# Patient Record
Sex: MALE | Race: WHITE | NOT HISPANIC OR LATINO | Employment: STUDENT | URBAN - METROPOLITAN AREA
[De-identification: names, ages, dates, MRNs, and addresses within clinical notes are randomized per-mention and may not be internally consistent; named-entity substitution may affect disease eponyms.]

---

## 2018-11-28 ENCOUNTER — PATIENT MESSAGE (OUTPATIENT)
Dept: INTERNAL MEDICINE | Facility: CLINIC | Age: 23
End: 2018-11-28

## 2018-11-28 RX ORDER — LAMOTRIGINE 100 MG/1
100 TABLET ORAL 2 TIMES DAILY
Qty: 60 TABLET | Refills: 0 | Status: SHIPPED | OUTPATIENT
Start: 2018-11-28 | End: 2018-12-19

## 2018-11-28 NOTE — TELEPHONE ENCOUNTER
Can get the patient in sooner for annual.  We can refill enough till the appointment.  I have sent in 60 pills of the 100 mg 1 tablet twice daily to [facility] for the patient.

## 2018-12-19 ENCOUNTER — PATIENT MESSAGE (OUTPATIENT)
Dept: INTERNAL MEDICINE | Facility: CLINIC | Age: 23
End: 2018-12-19

## 2018-12-19 ENCOUNTER — LAB VISIT (OUTPATIENT)
Dept: LAB | Facility: HOSPITAL | Age: 23
End: 2018-12-19
Attending: FAMILY MEDICINE
Payer: OTHER GOVERNMENT

## 2018-12-19 ENCOUNTER — OFFICE VISIT (OUTPATIENT)
Dept: INTERNAL MEDICINE | Facility: CLINIC | Age: 23
End: 2018-12-19
Payer: OTHER GOVERNMENT

## 2018-12-19 VITALS
BODY MASS INDEX: 27.22 KG/M2 | TEMPERATURE: 97 F | SYSTOLIC BLOOD PRESSURE: 130 MMHG | HEART RATE: 70 BPM | WEIGHT: 212.06 LBS | HEIGHT: 74 IN | DIASTOLIC BLOOD PRESSURE: 80 MMHG

## 2018-12-19 DIAGNOSIS — F31.70 BIPOLAR DISORDER IN REMISSION: ICD-10-CM

## 2018-12-19 DIAGNOSIS — Z00.00 ROUTINE GENERAL MEDICAL EXAMINATION AT A HEALTH CARE FACILITY: ICD-10-CM

## 2018-12-19 DIAGNOSIS — Z00.00 ROUTINE GENERAL MEDICAL EXAMINATION AT A HEALTH CARE FACILITY: Primary | ICD-10-CM

## 2018-12-19 LAB
ALBUMIN SERPL BCP-MCNC: 4.2 G/DL
ALP SERPL-CCNC: 85 U/L
ALT SERPL W/O P-5'-P-CCNC: 15 U/L
ANION GAP SERPL CALC-SCNC: 7 MMOL/L
AST SERPL-CCNC: 15 U/L
BASOPHILS # BLD AUTO: 0.02 K/UL
BASOPHILS NFR BLD: 0.4 %
BILIRUB SERPL-MCNC: 0.5 MG/DL
BUN SERPL-MCNC: 11 MG/DL
CALCIUM SERPL-MCNC: 9.8 MG/DL
CHLORIDE SERPL-SCNC: 106 MMOL/L
CHOLEST SERPL-MCNC: 182 MG/DL
CHOLEST/HDLC SERPL: 4 {RATIO}
CO2 SERPL-SCNC: 28 MMOL/L
CREAT SERPL-MCNC: 0.8 MG/DL
DIFFERENTIAL METHOD: NORMAL
EOSINOPHIL # BLD AUTO: 0 K/UL
EOSINOPHIL NFR BLD: 0.9 %
ERYTHROCYTE [DISTWIDTH] IN BLOOD BY AUTOMATED COUNT: 12.9 %
EST. GFR  (AFRICAN AMERICAN): >60 ML/MIN/1.73 M^2
EST. GFR  (NON AFRICAN AMERICAN): >60 ML/MIN/1.73 M^2
GLUCOSE SERPL-MCNC: 89 MG/DL
HCT VFR BLD AUTO: 42.8 %
HDLC SERPL-MCNC: 46 MG/DL
HDLC SERPL: 25.3 %
HGB BLD-MCNC: 15 G/DL
IMM GRANULOCYTES # BLD AUTO: 0.01 K/UL
IMM GRANULOCYTES NFR BLD AUTO: 0.2 %
LDLC SERPL CALC-MCNC: 105.6 MG/DL
LYMPHOCYTES # BLD AUTO: 1.9 K/UL
LYMPHOCYTES NFR BLD: 40.9 %
MCH RBC QN AUTO: 29.6 PG
MCHC RBC AUTO-ENTMCNC: 35 G/DL
MCV RBC AUTO: 84 FL
MONOCYTES # BLD AUTO: 0.4 K/UL
MONOCYTES NFR BLD: 8.4 %
NEUTROPHILS # BLD AUTO: 2.3 K/UL
NEUTROPHILS NFR BLD: 49.2 %
NONHDLC SERPL-MCNC: 136 MG/DL
NRBC BLD-RTO: 0 /100 WBC
PLATELET # BLD AUTO: 211 K/UL
PMV BLD AUTO: 10.5 FL
POTASSIUM SERPL-SCNC: 4.6 MMOL/L
PROT SERPL-MCNC: 7.5 G/DL
RBC # BLD AUTO: 5.07 M/UL
SODIUM SERPL-SCNC: 141 MMOL/L
TRIGL SERPL-MCNC: 152 MG/DL
TSH SERPL DL<=0.005 MIU/L-ACNC: 1.48 UIU/ML
WBC # BLD AUTO: 4.62 K/UL

## 2018-12-19 PROCEDURE — 99395 PREV VISIT EST AGE 18-39: CPT | Mod: S$PBB,,, | Performed by: FAMILY MEDICINE

## 2018-12-19 PROCEDURE — 99999 PR PBB SHADOW E&M-EST. PATIENT-LVL III: CPT | Mod: PBBFAC,,, | Performed by: FAMILY MEDICINE

## 2018-12-19 PROCEDURE — 99213 OFFICE O/P EST LOW 20 MIN: CPT | Mod: PBBFAC,PO | Performed by: FAMILY MEDICINE

## 2018-12-19 PROCEDURE — 85025 COMPLETE CBC W/AUTO DIFF WBC: CPT

## 2018-12-19 PROCEDURE — 36415 COLL VENOUS BLD VENIPUNCTURE: CPT | Mod: PO

## 2018-12-19 PROCEDURE — 80053 COMPREHEN METABOLIC PANEL: CPT

## 2018-12-19 PROCEDURE — 80061 LIPID PANEL: CPT

## 2018-12-19 PROCEDURE — 84443 ASSAY THYROID STIM HORMONE: CPT

## 2018-12-19 RX ORDER — LAMOTRIGINE 100 MG/1
100 TABLET ORAL DAILY
Qty: 90 TABLET | Refills: 4 | Status: SHIPPED | OUTPATIENT
Start: 2018-12-19 | End: 2020-06-08 | Stop reason: SDUPTHER

## 2018-12-19 RX ORDER — LAMOTRIGINE 100 MG/1
100 TABLET ORAL DAILY
Qty: 90 TABLET | Refills: 4 | Status: SHIPPED | OUTPATIENT
Start: 2018-12-19 | End: 2018-12-19

## 2018-12-19 NOTE — PROGRESS NOTES
Subjective:      Patient ID: Aime Ricardo is a 23 y.o. male.    Chief Complaint: Annual Exam    Disclaimer:  This note is prepared using voice recognition software and as such is likely to have errors and has not been proof read. Please contact me for questions.     Patient's coming in today for annual and med refills. Was seeing Dr. Vanessa Cohen at [facility] for bipolar disorder.  He is on Lamictal at 100mg daily. Doing well with this dose. Stable for last 2 years. Desires to have filled through PCP as too expensive for just med management through [facility].  Needing to have some lab work done.  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  He works at [employer].  He denies any problems with the medication.  He is up-to-date on his immunizations, declines flu shot        Lab Results   Component Value Date    WBC 4.09 08/18/2016    HGB 15.1 08/18/2016    HCT 44.2 08/18/2016     08/18/2016    CHOL 170 08/18/2016    TRIG 306 (H) 08/18/2016    HDL 36 (L) 08/18/2016    ALT 25 08/18/2016    AST 23 08/18/2016     08/18/2016    K 4.8 08/18/2016     08/18/2016    CREATININE 0.9 08/18/2016    BUN 13 08/18/2016    CO2 23 08/18/2016    TSH 1.474 08/18/2016       X-Ray Pelvis Routine AP  Narrative: Findings: Hip joint spaces are preserved with normal femoral head morphology.  No intraosseous lesion or avascular necrosis is evident.  Sacroiliac joints are open.  No fracture or dislocation.  Impression:  As above.    Electronically signed by: LISA SHARPE MD  Date:     08/30/13  Time:    17:02   X-Ray Hip 2 View Left  Narrative: Findings: Hip joint spaces are preserved with normal femoral head morphology.  No intraosseous lesion or avascular necrosis is evident.  Sacroiliac joints are open.  No fracture or dislocation.  Impression:  As above.    Electronically signed by: LISA SHARPE MD  Date:     08/30/13  Time:    17:02         Review of  "Systems   Constitutional: Negative for activity change, appetite change, chills, fatigue and unexpected weight change.   HENT: Negative for congestion, ear pain, postnasal drip, sneezing, sore throat and trouble swallowing.    Eyes: Negative for pain and visual disturbance.   Respiratory: Negative for cough and shortness of breath.    Cardiovascular: Negative for chest pain and leg swelling.   Gastrointestinal: Negative for abdominal pain, constipation, diarrhea, nausea and vomiting.   Endocrine: Negative for cold intolerance and heat intolerance.   Genitourinary: Negative for difficulty urinating, dysuria and flank pain.   Musculoskeletal: Negative for arthralgias, back pain, joint swelling and neck pain.   Skin: Negative for color change and rash.   Neurological: Negative for dizziness, seizures and headaches.   Psychiatric/Behavioral: Negative for behavioral problems, decreased concentration, dysphoric mood and sleep disturbance. The patient is not nervous/anxious.      Objective:     Vitals:    12/19/18 1241   BP: 130/80   Pulse: 70   Temp: 97 °F (36.1 °C)   TempSrc: Tympanic   Weight: 96.2 kg (212 lb 1.3 oz)   Height: 6' 2" (1.88 m)     Physical Exam   Constitutional: He is oriented to person, place, and time. He appears well-developed and well-nourished. No distress.   HENT:   Head: Normocephalic and atraumatic.   Right Ear: External ear normal.   Left Ear: External ear normal.   Nose: Nose normal.   Mouth/Throat: Oropharynx is clear and moist.   Eyes: EOM are normal. Pupils are equal, round, and reactive to light.   Neck: Normal range of motion. Neck supple. No thyromegaly present.   Cardiovascular: Normal rate and regular rhythm. Exam reveals no gallop and no friction rub.   No murmur heard.  Pulmonary/Chest: Effort normal and breath sounds normal. No respiratory distress.   Abdominal: Soft. Bowel sounds are normal. He exhibits no distension. There is no tenderness. There is no rebound.   Musculoskeletal: " "Normal range of motion.   Lymphadenopathy:     He has no cervical adenopathy.   Neurological: He is alert and oriented to person, place, and time. Coordination normal.   Skin: Skin is warm and dry.   Psychiatric: He has a normal mood and affect.   Vitals reviewed.    Assessment:     1. Routine general medical examination at a health care facility    2. Bipolar disorder in remission      Plan:   Aime was seen today for annual exam.    Diagnoses and all orders for this visit:    Routine general medical examination at a health care facility - labs ordered. Discussed Health Maintenance issues.     -     Lipid panel; Future  -     TSH; Future  -     Comprehensive metabolic panel; Future  -     CBC auto differential; Future    Bipolar disorder in remission  - stable with meds. Not seeing counselor currently. No si, no shine. Cont with meds. Likes this  recently so will get back to me on the specific name to add to the RX.   Other orders  -     lamoTRIgine (LAMICTAL) 100 MG tablet; Take 1 tablet (100 mg total) by mouth once daily.  [facility]   "LamoTRIgine 100MGTAD ZYD"             Follow-up in about 1 year (around 12/19/2019) for physical with Dr WELLINGTON.    There are no Patient Instructions on file for this visit.                  "

## 2019-04-10 ENCOUNTER — OFFICE VISIT (OUTPATIENT)
Dept: INTERNAL MEDICINE | Facility: CLINIC | Age: 24
End: 2019-04-10
Payer: OTHER GOVERNMENT

## 2019-04-10 VITALS
TEMPERATURE: 98 F | SYSTOLIC BLOOD PRESSURE: 132 MMHG | HEIGHT: 74 IN | WEIGHT: 206.81 LBS | BODY MASS INDEX: 26.54 KG/M2 | DIASTOLIC BLOOD PRESSURE: 84 MMHG

## 2019-04-10 DIAGNOSIS — J01.90 ACUTE NON-RECURRENT SINUSITIS, UNSPECIFIED LOCATION: Primary | ICD-10-CM

## 2019-04-10 DIAGNOSIS — F41.9 ANXIETY: ICD-10-CM

## 2019-04-10 PROCEDURE — 99213 OFFICE O/P EST LOW 20 MIN: CPT | Mod: PBBFAC,PO,25 | Performed by: FAMILY MEDICINE

## 2019-04-10 PROCEDURE — 99999 PR PBB SHADOW E&M-EST. PATIENT-LVL III: CPT | Mod: PBBFAC,,, | Performed by: FAMILY MEDICINE

## 2019-04-10 PROCEDURE — 99999 PR PBB SHADOW E&M-EST. PATIENT-LVL III: ICD-10-PCS | Mod: PBBFAC,,, | Performed by: FAMILY MEDICINE

## 2019-04-10 PROCEDURE — 96372 THER/PROPH/DIAG INJ SC/IM: CPT | Mod: PBBFAC,PO

## 2019-04-10 PROCEDURE — 99214 PR OFFICE/OUTPT VISIT, EST, LEVL IV, 30-39 MIN: ICD-10-PCS | Mod: S$PBB,,, | Performed by: FAMILY MEDICINE

## 2019-04-10 PROCEDURE — 99214 OFFICE O/P EST MOD 30 MIN: CPT | Mod: S$PBB,,, | Performed by: FAMILY MEDICINE

## 2019-04-10 RX ORDER — FLUTICASONE PROPIONATE 50 MCG
1 SPRAY, SUSPENSION (ML) NASAL DAILY
Qty: 16 G | Refills: 11 | Status: SHIPPED | OUTPATIENT
Start: 2019-04-10 | End: 2021-01-22

## 2019-04-10 RX ORDER — GUAIFENESIN, PSEUDOEPHEDRINE HYDROCHLORIDE 600; 60 MG/1; MG/1
1 TABLET, EXTENDED RELEASE ORAL 2 TIMES DAILY
Qty: 20 TABLET | Refills: 0 | Status: SHIPPED | OUTPATIENT
Start: 2019-04-10 | End: 2019-04-20

## 2019-04-10 RX ORDER — BETAMETHASONE SODIUM PHOSPHATE AND BETAMETHASONE ACETATE 3; 3 MG/ML; MG/ML
12 INJECTION, SUSPENSION INTRA-ARTICULAR; INTRALESIONAL; INTRAMUSCULAR; SOFT TISSUE ONCE
Status: COMPLETED | OUTPATIENT
Start: 2019-04-10 | End: 2019-04-10

## 2019-04-10 RX ADMIN — BETAMETHASONE SODIUM PHOSPHATE AND BETAMETHASONE ACETATE 12 MG: 3; 3 INJECTION, SUSPENSION INTRA-ARTICULAR; INTRALESIONAL; INTRAMUSCULAR at 08:04

## 2019-04-10 NOTE — PROGRESS NOTES
Subjective:      Patient ID: Aime Ricardo is a 23 y.o. male.    Chief Complaint: Nasal Congestion and Cough    Disclaimer:  This note is prepared using voice recognition software and as such is likely to have errors and has not been proof read. Please contact me for questions.     Aime Ricardo is a 23 y.o. male who presents today for upper respiratory illness. Symptoms for 1 week. Taking dayquil and emergen-c. suj fever at times. Chills at times. Sleeping ok off and on.     Still going to work. Work exposed to stomach flu. No vomiting no diarrhea.   Pressure in sinuses.   Pain at times.   Pnd, cough, nasal congestion. Productive cough.     Didn't get the flu shot. Works at [employer]. Hasn't been able to take off work till today.     Doing well with anxiety and on lamictal.       URI    This is a new problem. The current episode started in the past 7 days. The problem has been unchanged. The maximum temperature recorded prior to his arrival was 100.4 - 100.9 F. The fever has been present for less than 1 day. Associated symptoms include congestion, coughing, diarrhea, ear pain, a plugged ear sensation, rhinorrhea, sinus pain, sneezing, a sore throat and swollen glands. Pertinent negatives include no abdominal pain, chest pain, dysuria, headaches, joint pain, joint swelling, nausea, neck pain, rash, vomiting or wheezing. He has tried antihistamine, decongestant, acetaminophen, increased fluids and sleep for the symptoms. The treatment provided no relief.       Lab Results   Component Value Date    WBC 4.62 12/19/2018    HGB 15.0 12/19/2018    HCT 42.8 12/19/2018     12/19/2018    CHOL 182 12/19/2018    TRIG 152 (H) 12/19/2018    HDL 46 12/19/2018    ALT 15 12/19/2018    AST 15 12/19/2018     12/19/2018    K 4.6 12/19/2018     12/19/2018    CREATININE 0.8 12/19/2018    BUN 11 12/19/2018    CO2 28 12/19/2018    TSH 1.483 12/19/2018       X-Ray Pelvis Routine AP  Narrative: Findings: Hip  "joint spaces are preserved with normal femoral head morphology.  No intraosseous lesion or avascular necrosis is evident.  Sacroiliac joints are open.  No fracture or dislocation.  Impression:  As above.    Electronically signed by: LISA SHARPE MD  Date:     08/30/13  Time:    17:02   X-Ray Hip 2 View Left  Narrative: Findings: Hip joint spaces are preserved with normal femoral head morphology.  No intraosseous lesion or avascular necrosis is evident.  Sacroiliac joints are open.  No fracture or dislocation.  Impression:  As above.    Electronically signed by: LISA SHARPE MD  Date:     08/30/13  Time:    17:02         Review of Systems   Constitutional: Positive for appetite change, chills and fatigue. Negative for activity change, fever and unexpected weight change.   HENT: Positive for congestion, ear pain, postnasal drip, rhinorrhea, sinus pressure, sinus pain, sneezing and sore throat. Negative for nosebleeds, trouble swallowing and voice change.    Eyes: Negative for discharge.   Respiratory: Positive for cough. Negative for shortness of breath and wheezing.    Cardiovascular: Negative for chest pain and palpitations.   Gastrointestinal: Positive for diarrhea. Negative for abdominal distention, abdominal pain, constipation, nausea and vomiting.   Genitourinary: Negative for difficulty urinating, dysuria and hematuria.   Musculoskeletal: Negative for joint pain and neck pain.   Skin: Negative for rash.   Neurological: Negative for light-headedness and headaches.   Psychiatric/Behavioral: Positive for sleep disturbance. Negative for dysphoric mood.     Objective:     Vitals:    04/10/19 0751   BP: 132/84   Temp: 97.9 °F (36.6 °C)   TempSrc: Oral   Weight: 93.8 kg (206 lb 12.7 oz)   Height: 6' 2" (1.88 m)     Physical Exam   Constitutional: He appears well-developed and well-nourished.   HENT:   Head: Normocephalic and atraumatic.   Right Ear: Tympanic membrane and external ear normal.   Left Ear: Tympanic " membrane and external ear normal.   Nose: Mucosal edema and rhinorrhea present. Right sinus exhibits frontal sinus tenderness. Right sinus exhibits no maxillary sinus tenderness. Left sinus exhibits frontal sinus tenderness. Left sinus exhibits no maxillary sinus tenderness.   Mouth/Throat: Uvula is midline and mucous membranes are normal. Posterior oropharyngeal erythema present. No oropharyngeal exudate or posterior oropharyngeal edema.   Eyes: Conjunctivae and EOM are normal.   Neck: Normal range of motion. Neck supple.   Cardiovascular: Normal rate and regular rhythm.   Pulmonary/Chest: Effort normal and breath sounds normal.   Vitals reviewed.    Assessment:     1. Acute non-recurrent sinusitis, unspecified location    2. Anxiety      Plan:   Aime was seen today for nasal congestion and cough.    Diagnoses and all orders for this visit:    Acute non-recurrent sinusitis, unspecified location this time discussed length of duration and current treatment options.  Suspect viral etiology.  Will give Celestone 12 mg IM injection x1 today.  Add Flonase.  Add Mucinex D. Continue do this for the next 5-7 days.  If not improving with these as well as rest and hydration then recommend patient return back to have further evaluation and additional treatment options.  Patient was amenable to this plan.  Does not need a work excuse at this time.    Anxiety-stable currently with Lamictal.  No problems at this time.    Other orders  -     betamethasone acetate-betamethasone sodium phosphate injection 12 mg  -     fluticasone (FLONASE) 50 mcg/actuation nasal spray; 1 spray (50 mcg total) by Each Nare route once daily.  -     pseudoephedrine-guaifenesin  mg (MUCINEX D)  mg per tablet; Take 1 tablet by mouth 2 (two) times daily. for 10 days            Follow up if symptoms worsen or fail to improve.    There are no Patient Instructions on file for this visit.

## 2019-09-27 ENCOUNTER — OFFICE VISIT (OUTPATIENT)
Dept: URGENT CARE | Facility: CLINIC | Age: 24
End: 2019-09-27
Payer: OTHER GOVERNMENT

## 2019-09-27 VITALS
BODY MASS INDEX: 24.68 KG/M2 | HEART RATE: 75 BPM | TEMPERATURE: 99 F | WEIGHT: 192.25 LBS | SYSTOLIC BLOOD PRESSURE: 120 MMHG | DIASTOLIC BLOOD PRESSURE: 80 MMHG | OXYGEN SATURATION: 98 %

## 2019-09-27 DIAGNOSIS — B96.89 BACTERIAL LOWER RESPIRATORY INFECTION: Primary | ICD-10-CM

## 2019-09-27 DIAGNOSIS — J22 BACTERIAL LOWER RESPIRATORY INFECTION: Primary | ICD-10-CM

## 2019-09-27 DIAGNOSIS — R05.9 COUGH: ICD-10-CM

## 2019-09-27 PROCEDURE — 99213 OFFICE O/P EST LOW 20 MIN: CPT | Mod: PBBFAC,PO | Performed by: PHYSICIAN ASSISTANT

## 2019-09-27 PROCEDURE — 99214 PR OFFICE/OUTPT VISIT, EST, LEVL IV, 30-39 MIN: ICD-10-PCS | Mod: S$PBB,,, | Performed by: PHYSICIAN ASSISTANT

## 2019-09-27 PROCEDURE — 99999 PR PBB SHADOW E&M-EST. PATIENT-LVL III: CPT | Mod: PBBFAC,,, | Performed by: PHYSICIAN ASSISTANT

## 2019-09-27 PROCEDURE — 99999 PR PBB SHADOW E&M-EST. PATIENT-LVL III: ICD-10-PCS | Mod: PBBFAC,,, | Performed by: PHYSICIAN ASSISTANT

## 2019-09-27 PROCEDURE — 99214 OFFICE O/P EST MOD 30 MIN: CPT | Mod: S$PBB,,, | Performed by: PHYSICIAN ASSISTANT

## 2019-09-27 RX ORDER — AZITHROMYCIN 250 MG/1
250 TABLET, FILM COATED ORAL DAILY
Qty: 6 TABLET | Refills: 0 | Status: SHIPPED | OUTPATIENT
Start: 2019-09-27 | End: 2019-10-02

## 2019-09-27 RX ORDER — PROMETHAZINE HYDROCHLORIDE AND DEXTROMETHORPHAN HYDROBROMIDE 6.25; 15 MG/5ML; MG/5ML
5 SYRUP ORAL NIGHTLY PRN
Qty: 118 ML | Refills: 0 | Status: SHIPPED | OUTPATIENT
Start: 2019-09-27 | End: 2019-10-07

## 2019-09-27 RX ORDER — BENZONATATE 100 MG/1
100 CAPSULE ORAL 3 TIMES DAILY PRN
Qty: 30 CAPSULE | Refills: 0 | Status: SHIPPED | OUTPATIENT
Start: 2019-09-27 | End: 2019-10-07

## 2019-09-27 NOTE — PROGRESS NOTES
Subjective:      Patient ID: Aime Ricardo is a 24 y.o. male.    Chief Complaint: URI    URI    This is a new problem. Episode onset: 2-3 weeks  The problem has been gradually worsening. There has been no fever. Associated symptoms include congestion, coughing (dark green sputum ), ear pain (right ear occcasional ), headaches and rhinorrhea. Pertinent negatives include no chest pain, plugged ear sensation, sinus pain, sneezing, sore throat (off and on from coughing and drip ) or wheezing. Treatments tried: dayquil, nyquil, mucinex      Review of Systems   Constitutional: Negative for chills, diaphoresis, fatigue and fever.   HENT: Positive for congestion, ear pain (right ear occcasional ) and rhinorrhea. Negative for ear discharge, postnasal drip, sinus pressure, sinus pain, sneezing and sore throat (off and on from coughing and drip ).    Eyes: Negative for pain and redness.   Respiratory: Positive for cough (dark green sputum ). Negative for shortness of breath and wheezing.    Cardiovascular: Negative for chest pain and palpitations.   Musculoskeletal: Negative for back pain and myalgias.   Neurological: Positive for headaches.       Objective:   /80 (BP Location: Right arm, Patient Position: Sitting, BP Method: Large (Manual))   Pulse 75   Temp 98.7 °F (37.1 °C) (Oral)   Wt 87.2 kg (192 lb 3.9 oz)   SpO2 98%   BMI 24.68 kg/m²   Physical Exam   Constitutional: He is oriented to person, place, and time. He appears well-developed and well-nourished. No distress.   HENT:   Head: Normocephalic.   Right Ear: External ear and ear canal normal. A middle ear effusion is present.   Left Ear: Tympanic membrane, external ear and ear canal normal.   Nose: Nose normal. No mucosal edema or rhinorrhea. Right sinus exhibits no maxillary sinus tenderness and no frontal sinus tenderness. Left sinus exhibits no maxillary sinus tenderness and no frontal sinus tenderness.   Mouth/Throat: Uvula is midline, oropharynx  is clear and moist and mucous membranes are normal. No oropharyngeal exudate, posterior oropharyngeal edema or posterior oropharyngeal erythema (hyperemia ).   Eyes: Conjunctivae and EOM are normal.   Neck: Normal range of motion. Neck supple.   Cardiovascular: Normal rate, regular rhythm and normal heart sounds.   Pulmonary/Chest: Effort normal and breath sounds normal. No accessory muscle usage. No apnea, no tachypnea and no bradypnea. No respiratory distress. He has no decreased breath sounds. He has no wheezes. He has no rhonchi. He has no rales.   Lymphadenopathy:        Head (right side): No submental, no submandibular and no tonsillar adenopathy present.        Head (left side): No submental, no submandibular and no tonsillar adenopathy present.     He has no cervical adenopathy.   Neurological: He is alert and oriented to person, place, and time.   Skin: Skin is warm and dry. He is not diaphoretic.     Assessment:      1. Bacterial lower respiratory infection    2. Cough       Plan:   Bacterial lower respiratory infection  -     azithromycin (Z-KALPESH) 250 MG tablet; Take 1 tablet (250 mg total) by mouth once daily. Take two tablets on day one and take one tablet daily for the next four days. for 5 days  Dispense: 6 tablet; Refill: 0    Cough  -     promethazine-dextromethorphan (PROMETHAZINE-DM) 6.25-15 mg/5 mL Syrp; Take 5 mLs by mouth nightly as needed.  Dispense: 118 mL; Refill: 0  -     benzonatate (TESSALON) 100 MG capsule; Take 1 capsule (100 mg total) by mouth 3 (three) times daily as needed for Cough (Do not take more than 6 capsules in a 24 hour period.).  Dispense: 30 capsule; Refill: 0    Lower Respiratory Bacterial Infection    -  Given 2-3 weeks of symptoms, start antibiotic    -  mucinex to thin mucous, increase fluids, humidifier, prop up at night     Cough    -  Tessalon perles for daytime cough as needed    -  promethazine dm for night time cough only (sedating - no driving while taking)      AVS provided and instructions reviewed with patient. Patient was counseled on supportive care and instructed to return or contact primary care provider if condition does not improve or for any new or worsening symptoms.    Laura Morales PA-C   Physician Assistant   [facility]

## 2019-09-27 NOTE — PATIENT INSTRUCTIONS
mucinex to thin mucous   Take antibiotic as prescribed   Increase fluids  Humidifier  Prop up at night   Tessalon perles for daytime cough as needed   Promethazine dm for night time cough only (sedating - no driving while taking)

## 2019-11-22 ENCOUNTER — OFFICE VISIT (OUTPATIENT)
Dept: URGENT CARE | Facility: CLINIC | Age: 24
End: 2019-11-22
Payer: OTHER GOVERNMENT

## 2019-11-22 VITALS
DIASTOLIC BLOOD PRESSURE: 75 MMHG | WEIGHT: 193.56 LBS | SYSTOLIC BLOOD PRESSURE: 133 MMHG | HEART RATE: 75 BPM | TEMPERATURE: 98 F | BODY MASS INDEX: 24.84 KG/M2 | OXYGEN SATURATION: 100 % | HEIGHT: 74 IN

## 2019-11-22 DIAGNOSIS — R52 GENERALIZED BODY ACHES: Primary | ICD-10-CM

## 2019-11-22 DIAGNOSIS — R50.9 FEVER, UNSPECIFIED FEVER CAUSE: ICD-10-CM

## 2019-11-22 DIAGNOSIS — J06.9 URI WITH COUGH AND CONGESTION: ICD-10-CM

## 2019-11-22 LAB
CTP QC/QA: YES
FLUAV AG NPH QL: NEGATIVE
FLUBV AG NPH QL: NEGATIVE

## 2019-11-22 PROCEDURE — 87804 INFLUENZA ASSAY W/OPTIC: CPT | Mod: QW,S$GLB,, | Performed by: NURSE PRACTITIONER

## 2019-11-22 PROCEDURE — 99214 OFFICE O/P EST MOD 30 MIN: CPT | Mod: S$GLB,,, | Performed by: NURSE PRACTITIONER

## 2019-11-22 PROCEDURE — 87804 POCT INFLUENZA A/B: ICD-10-PCS | Mod: 59,QW,S$GLB, | Performed by: NURSE PRACTITIONER

## 2019-11-22 PROCEDURE — 99214 PR OFFICE/OUTPT VISIT, EST, LEVL IV, 30-39 MIN: ICD-10-PCS | Mod: S$GLB,,, | Performed by: NURSE PRACTITIONER

## 2019-11-22 RX ORDER — PROMETHAZINE HYDROCHLORIDE AND DEXTROMETHORPHAN HYDROBROMIDE 6.25; 15 MG/5ML; MG/5ML
5 SYRUP ORAL
Qty: 100 ML | Refills: 0 | Status: SHIPPED | OUTPATIENT
Start: 2019-11-22 | End: 2019-12-02

## 2019-11-22 NOTE — PATIENT INSTRUCTIONS
PLAN: Lab work POCT Influenza screen  Advise increase p.o. fluids--water/juice & rest  Meds:  Phenergan DM  / no refills  Simply saline nasal wash to irrigate sinuses and for congestion/runny nose.  Cool mist humidifier/vaporizer.  Practice good handwashing.  Tylenol or Ibuprofen for fever, headache and body aches.  Warm salt water gargles for throat comfort.  Chloraseptic spray or lozenges for throat comfort.  Advise follow up with PCP  Advise go to ER if symptoms worsen or fail to improve with treatment.  Instructions, follow up, and supportive care as per AVS.  AVS provided and reviewed with patient including supportive care, follow up, and red flag symptoms.   Patient verbalizes understanding and agrees with treatment plan. Discharged from Urgent Care in stable condition.

## 2019-11-22 NOTE — PROGRESS NOTES
"Subjective:       Patient ID: Aime Ricardo is a 24 y.o. male.    Vitals:  height is 6' 2" (1.88 m) and weight is 87.8 kg (193 lb 9 oz). His oral temperature is 98.2 °F (36.8 °C). His blood pressure is 133/75 and his pulse is 75. His oxygen saturation is 100%.     Chief Complaint: Sinus Problem    URI    This is a new problem. The current episode started in the past 7 days. The problem has been unchanged. The maximum temperature recorded prior to his arrival was 100.4 - 100.9 F. Associated symptoms include congestion and coughing. Pertinent negatives include no sinus pain or sore throat. He has tried acetaminophen for the symptoms. The treatment provided no relief.       Constitution: Positive for fever. Negative for chills, sweating and fatigue.   HENT: Positive for congestion and postnasal drip. Negative for sinus pain, sinus pressure, sore throat and voice change.    Neck: Negative for painful lymph nodes.   Eyes: Negative for eye redness.   Respiratory: Positive for cough. Negative for chest tightness, sputum production, bloody sputum, COPD, shortness of breath, stridor and asthma.    Musculoskeletal: Negative for muscle ache.   Allergic/Immunologic: Negative for seasonal allergies and asthma.   Hematologic/Lymphatic: Negative for swollen lymph nodes.        Past Medical History:   Diagnosis Date    Acne     Anxiety     Asthma     Bipolar 2 disorder 07/20/2016    dx by Dr. Cohen at [facility] , reported by patient         .History reviewed. No pertinent surgical history.      Social History     Socioeconomic History    Marital status: Single     Spouse name: Not on file    Number of children: Not on file    Years of education: Not on file    Highest education level: Not on file   Occupational History    Occupation: student     Comment: studying film     Comment: [employer]    Social Needs    Financial resource strain: Not on file    Food insecurity:     Worry: Not on file     Inability: Not on " file    Transportation needs:     Medical: Not on file     Non-medical: Not on file   Tobacco Use    Smoking status: Never Smoker    Smokeless tobacco: Never Used   Substance and Sexual Activity    Alcohol use: No    Drug use: No    Sexual activity: Yes     Partners: Male     Birth control/protection: Condom   Lifestyle    Physical activity:     Days per week: Not on file     Minutes per session: Not on file    Stress: Not on file   Relationships    Social connections:     Talks on phone: Not on file     Gets together: Not on file     Attends [employer] service: Not on file     Active member of club or organization: Not on file     Attends meetings of clubs or organizations: Not on file     Relationship status: Not on file   Other Topics Concern    Not on file   Social History Narrative    Not on file       History reviewed. No pertinent family history.      ROS:  GENERAL: fever, chills with body aches  SKIN: No rashes, itching or changes in color or texture of skin.   HEENT:  Reports runny nose, nasal congestion, headache  NODES: No masses or lesions. Denies swollen glands.   CHEST: reports cough   CARDIOVASCULAR: Denies chest pain, shortness of breath.  ABDOMEN: Appetite fine. No weight loss. Denies diarrhea, abdominal pain  MUSCULOSKELETAL: No joint stiffness or swelling. Denies back pain.  NEUROLOGIC: No history of seizures, paralysis, alteration of gait or coordination.  PSYCHIATRIC: Denies mood swings, depression or suicidal thoughts.    PE:   APPEARANCE: Well nourished, well developed, in mild distress.   V/S: Reviewed.  SKIN: Normal skin turgor, no lesions.  HEENT: Turbinates injected, mucus membranes okay minimal red pharynx. TM's poor light reflex bilateral, no facial tenderness.  CHEST: Lungs clear to auscultation. No wheezing  CARDIOVASCULAR: Regular rate and rhythm.  ABDOMEN: Soft. No tenderness or masses.  NEUROLOGIC: No sensory deficits. Gait & Posture: Normal, No cerebellar signs.  MENTAL  STATUS: Patient alert, oriented x 3 & conversant.    PLAN: Lab work POCT Influenza screen  Advise increase p.o. fluids--water/juice & rest  Meds:  Phenergan DM  / no refills  Simply saline nasal wash to irrigate sinuses and for congestion/runny nose.  Cool mist humidifier/vaporizer.  Practice good handwashing.  Tylenol or Ibuprofen for fever, headache and body aches.  Warm salt water gargles for throat comfort.  Chloraseptic spray or lozenges for throat comfort.  Advise follow up with PCP  Advise go to ER if symptoms worsen or fail to improve with treatment.  Instructions, follow up, and supportive care as per AVS.  AVS provided and reviewed with patient including supportive care, follow up, and red flag symptoms.   Patient verbalizes understanding and agrees with treatment plan. Discharged from Urgent Care in stable condition.  .      DIAGNOSIS:  Fever  Body aches  URI with Cough and congestion

## 2019-11-22 NOTE — LETTER
[facility]  [address], [address]  [place] [address]  Phone: [phone] November 22, 2019    Patient: Aime Ricardo   Patient ID [id number]   YOB: 1995   Date of Visit: 11/22/2019       To Whom It May Concern:    Aime Ricardo was seen and treated in our URGENT CARE department on 11/22/2019. He may return to work on November 25, 2019.    Sincerely,       Arabella Hercules NP

## 2020-06-08 RX ORDER — LAMOTRIGINE 100 MG/1
100 TABLET ORAL DAILY
Qty: 90 TABLET | Refills: 0 | Status: SHIPPED | OUTPATIENT
Start: 2020-06-08 | End: 2021-01-22 | Stop reason: SDUPTHER

## 2020-07-07 ENCOUNTER — OFFICE VISIT (OUTPATIENT)
Dept: FAMILY MEDICINE | Facility: CLINIC | Age: 25
End: 2020-07-07

## 2020-07-07 DIAGNOSIS — R21 RASH: ICD-10-CM

## 2020-07-07 DIAGNOSIS — T78.40XA ALLERGIC REACTION, INITIAL ENCOUNTER: Primary | ICD-10-CM

## 2020-07-07 PROCEDURE — 99213 OFFICE O/P EST LOW 20 MIN: CPT | Mod: 95,,, | Performed by: NURSE PRACTITIONER

## 2020-07-07 PROCEDURE — 99213 PR OFFICE/OUTPT VISIT, EST, LEVL III, 20-29 MIN: ICD-10-PCS | Mod: 95,,, | Performed by: NURSE PRACTITIONER

## 2020-07-07 RX ORDER — HYDROXYZINE PAMOATE 25 MG/1
25 CAPSULE ORAL EVERY 8 HOURS PRN
Qty: 15 CAPSULE | Refills: 0 | Status: SHIPPED | OUTPATIENT
Start: 2020-07-07 | End: 2020-07-12

## 2020-07-07 RX ORDER — TRIAMCINOLONE ACETONIDE 0.25 MG/G
CREAM TOPICAL 2 TIMES DAILY
Qty: 15 G | Refills: 0 | Status: SHIPPED | OUTPATIENT
Start: 2020-07-07 | End: 2021-01-22

## 2020-07-07 NOTE — PROGRESS NOTES
Subjective:       Patient ID: Aime Ricardo is a 24 y.o. male.    Chief Complaint: No chief complaint on file.  The patient location is: [place]  The chief complaint leading to consultation is: Allergic reaction to face wash    Visit type: audiovisual    Face to Face time with patient: 10 min  minutes of total time spent on the encounter, which includes face to face time and non-face to face time preparing to see the patient (eg, review of tests), Obtaining and/or reviewing separately obtained history, Documenting clinical information in the electronic or other health record, Independently interpreting results (not separately reported) and communicating results to the patient/family/caregiver, or Care coordination (not separately reported).     Each patient to whom he or she provides medical services by telemedicine is:  (1) informed of the relationship between the physician and patient and the respective role of any other health care provider with respect to management of the patient; and (2) notified that he or she may decline to receive medical services by telemedicine and may withdraw from such care at any time.    Rash  This is a new problem. The current episode started in the past 7 days. The problem has been gradually worsening since onset. The affected locations include the face. The rash is characterized by burning. He was exposed to chemicals. Pertinent negatives include no anorexia, congestion, cough, diarrhea, eye pain, facial edema, fatigue, fever, joint pain, nail changes, rhinorrhea, shortness of breath, sore throat or vomiting. Past treatments include anti-itch cream, antihistamine, cold compress, moisturizer and topical steroids. The treatment provided no relief. His past medical history is significant for allergies, asthma and eczema. There is no history of varicella.     Past Medical History:   Diagnosis Date    Acne     Anxiety     Asthma     Bipolar 2 disorder 07/20/2016    dx by   No[facility] at [facility] , reported by patient     Social History     Socioeconomic History    Marital status: Single     Spouse name: Not on file    Number of children: Not on file    Years of education: Not on file    Highest education level: Not on file   Occupational History    Occupation: student     Comment: studying film     Comment: flora    Social Needs    Financial resource strain: Not on file    Food insecurity     Worry: Not on file     Inability: Not on file    Transportation needs     Medical: Not on file     Non-medical: Not on file   Tobacco Use    Smoking status: Never Smoker    Smokeless tobacco: Never Used   Substance and Sexual Activity    Alcohol use: No    Drug use: No    Sexual activity: Yes     Partners: Male     Birth control/protection: Condom   Lifestyle    Physical activity     Days per week: Not on file     Minutes per session: Not on file    Stress: Not on file   Relationships    Social connections     Talks on phone: Not on file     Gets together: Not on file     Attends [employer] service: Not on file     Active member of club or organization: Not on file     Attends meetings of clubs or organizations: Not on file     Relationship status: Not on file   Other Topics Concern    Not on file   Social History Narrative    Not on file     History reviewed. No pertinent surgical history.    Review of Systems   Constitutional: Negative.  Negative for fatigue and fever.   HENT: Negative.  Negative for nasal congestion, rhinorrhea and sore throat.    Eyes: Negative.  Negative for pain.   Respiratory: Negative.  Negative for cough and shortness of breath.    Cardiovascular: Negative.    Gastrointestinal: Negative.  Negative for anorexia, diarrhea and vomiting.   Endocrine: Negative.    Genitourinary: Negative.    Musculoskeletal: Negative.  Negative for joint pain.   Integumentary:  Positive for rash. Negative for nail changes.   Allergic/Immunologic: Negative.    Neurological:  Negative.    Psychiatric/Behavioral: Negative.          Objective:      Physical Exam  Constitutional:       Appearance: Normal appearance.   Neurological:      Mental Status: He is alert.         Assessment:       1. Allergic reaction, initial encounter    2. Rash        Plan:           Diagnoses and all orders for this visit:    Allergic reaction, initial encounter  Rash  -     hydrOXYzine pamoate (VISTARIL) 25 MG Cap; Take 1 capsule (25 mg total) by mouth every 8 (eight) hours as needed.  -     triamcinolone acetonide 0.025% (KENALOG) 0.025 % cream; Apply topically 2 (two) times daily. Apply very thin film to affected area for 5 days  F/U with PCP  Report to ER immediately if symptoms worsen

## 2020-09-22 RX ORDER — LAMOTRIGINE 100 MG/1
TABLET ORAL
Qty: 90 TABLET | Refills: 0 | OUTPATIENT
Start: 2020-09-22

## 2020-09-22 NOTE — TELEPHONE ENCOUNTER
Refill denied.  Patient needs to schedule an appointment with PCP or PA prior to further refills. Please inform patient and assist with scheduling. Visit can be virtual or in person.

## 2021-01-11 RX ORDER — LAMOTRIGINE 100 MG/1
100 TABLET ORAL DAILY
Qty: 90 TABLET | Refills: 0 | OUTPATIENT
Start: 2021-01-11

## 2021-01-22 ENCOUNTER — OFFICE VISIT (OUTPATIENT)
Dept: PRIMARY CARE CLINIC | Facility: CLINIC | Age: 26
End: 2021-01-22

## 2021-01-22 DIAGNOSIS — F31.9 BIPOLAR AFFECTIVE DISORDER, REMISSION STATUS UNSPECIFIED: Primary | ICD-10-CM

## 2021-01-22 PROCEDURE — 99214 OFFICE O/P EST MOD 30 MIN: CPT | Mod: 95,,, | Performed by: PHYSICIAN ASSISTANT

## 2021-01-22 PROCEDURE — 99214 PR OFFICE/OUTPT VISIT, EST, LEVL IV, 30-39 MIN: ICD-10-PCS | Mod: 95,,, | Performed by: PHYSICIAN ASSISTANT

## 2021-01-22 RX ORDER — LAMOTRIGINE 100 MG/1
100 TABLET ORAL DAILY
Qty: 90 TABLET | Refills: 3 | Status: SHIPPED | OUTPATIENT
Start: 2021-01-22 | End: 2023-01-04

## 2021-04-28 ENCOUNTER — PATIENT MESSAGE (OUTPATIENT)
Dept: RESEARCH | Facility: HOSPITAL | Age: 26
End: 2021-04-28

## 2021-12-22 ENCOUNTER — OFFICE VISIT (OUTPATIENT)
Dept: PRIMARY CARE CLINIC | Facility: CLINIC | Age: 26
End: 2021-12-22
Payer: COMMERCIAL

## 2021-12-22 VITALS
WEIGHT: 201.38 LBS | DIASTOLIC BLOOD PRESSURE: 80 MMHG | OXYGEN SATURATION: 97 % | HEART RATE: 63 BPM | TEMPERATURE: 98 F | SYSTOLIC BLOOD PRESSURE: 128 MMHG | BODY MASS INDEX: 25.86 KG/M2

## 2021-12-22 DIAGNOSIS — J11.1 INFLUENZA-LIKE ILLNESS: Primary | ICD-10-CM

## 2021-12-22 PROCEDURE — 99213 PR OFFICE/OUTPT VISIT, EST, LEVL III, 20-29 MIN: ICD-10-PCS | Mod: S$GLB,,, | Performed by: NURSE PRACTITIONER

## 2021-12-22 PROCEDURE — 99999 PR PBB SHADOW E&M-EST. PATIENT-LVL III: ICD-10-PCS | Mod: PBBFAC,,, | Performed by: NURSE PRACTITIONER

## 2021-12-22 PROCEDURE — 3079F DIAST BP 80-89 MM HG: CPT | Mod: CPTII,S$GLB,, | Performed by: NURSE PRACTITIONER

## 2021-12-22 PROCEDURE — 3008F BODY MASS INDEX DOCD: CPT | Mod: CPTII,S$GLB,, | Performed by: NURSE PRACTITIONER

## 2021-12-22 PROCEDURE — 3074F SYST BP LT 130 MM HG: CPT | Mod: CPTII,S$GLB,, | Performed by: NURSE PRACTITIONER

## 2021-12-22 PROCEDURE — 1160F PR REVIEW ALL MEDS BY PRESCRIBER/CLIN PHARMACIST DOCUMENTED: ICD-10-PCS | Mod: CPTII,S$GLB,, | Performed by: NURSE PRACTITIONER

## 2021-12-22 PROCEDURE — 1159F PR MEDICATION LIST DOCUMENTED IN MEDICAL RECORD: ICD-10-PCS | Mod: CPTII,S$GLB,, | Performed by: NURSE PRACTITIONER

## 2021-12-22 PROCEDURE — 1160F RVW MEDS BY RX/DR IN RCRD: CPT | Mod: CPTII,S$GLB,, | Performed by: NURSE PRACTITIONER

## 2021-12-22 PROCEDURE — 1159F MED LIST DOCD IN RCRD: CPT | Mod: CPTII,S$GLB,, | Performed by: NURSE PRACTITIONER

## 2021-12-22 PROCEDURE — 3074F PR MOST RECENT SYSTOLIC BLOOD PRESSURE < 130 MM HG: ICD-10-PCS | Mod: CPTII,S$GLB,, | Performed by: NURSE PRACTITIONER

## 2021-12-22 PROCEDURE — 3008F PR BODY MASS INDEX (BMI) DOCUMENTED: ICD-10-PCS | Mod: CPTII,S$GLB,, | Performed by: NURSE PRACTITIONER

## 2021-12-22 PROCEDURE — 99213 OFFICE O/P EST LOW 20 MIN: CPT | Mod: S$GLB,,, | Performed by: NURSE PRACTITIONER

## 2021-12-22 PROCEDURE — 99999 PR PBB SHADOW E&M-EST. PATIENT-LVL III: CPT | Mod: PBBFAC,,, | Performed by: NURSE PRACTITIONER

## 2021-12-22 PROCEDURE — 3079F PR MOST RECENT DIASTOLIC BLOOD PRESSURE 80-89 MM HG: ICD-10-PCS | Mod: CPTII,S$GLB,, | Performed by: NURSE PRACTITIONER

## 2021-12-22 RX ORDER — PROMETHAZINE HYDROCHLORIDE AND DEXTROMETHORPHAN HYDROBROMIDE 6.25; 15 MG/5ML; MG/5ML
5 SYRUP ORAL EVERY 8 HOURS PRN
Qty: 180 ML | Refills: 0 | Status: SHIPPED | OUTPATIENT
Start: 2021-12-22 | End: 2022-01-01

## 2021-12-22 RX ORDER — DEXBROMPHENIRAMINE MALEATE, PHENYLEPHRINE HYDROCHLORIDE 2; 7.5 MG/1; MG/1
1 TABLET ORAL 4 TIMES DAILY PRN
Qty: 30 TABLET | Refills: 0 | Status: SHIPPED | OUTPATIENT
Start: 2021-12-22 | End: 2023-01-04

## 2021-12-22 RX ORDER — OSELTAMIVIR PHOSPHATE 75 MG/1
75 CAPSULE ORAL 2 TIMES DAILY
Qty: 10 CAPSULE | Refills: 0 | Status: SHIPPED | OUTPATIENT
Start: 2021-12-22 | End: 2021-12-27

## 2021-12-22 RX ORDER — BENZONATATE 200 MG/1
200 CAPSULE ORAL 3 TIMES DAILY PRN
Qty: 30 CAPSULE | Refills: 0 | Status: SHIPPED | OUTPATIENT
Start: 2021-12-22 | End: 2022-01-01

## 2021-12-22 NOTE — PATIENT INSTRUCTIONS
"Patient Education       Flu   The Basics   Written by the doctors and editors at [facility]   What is the flu? -- The flu is an infection that can cause fever, cough, body aches, and other symptoms. The most common type of flu is the "seasonal" flu. There are different forms of seasonal flu, for example, "type A" and "type B."  All forms of the flu are caused by viruses. The medical term for the flu is "influenza."  What are the other types of flu? -- Besides seasonal flu, there is also the "swine" flu, which caused a worldwide outbreak ("pandemic") in 2009 and 2010, and the bird flu. Bird flu (also known as "aditya flu") is a severe form of the flu that is caused by a type of flu virus that first infected birds.  What are the most common flu symptoms? -- All forms of the flu can cause:  · Fever (temperature higher than 100ºF or 37.8ºC)  · Extreme tiredness  · Headache or body aches  · Cough  · Sore throat  · Runny nose  Flu symptoms can come on very suddenly.  Is the flu dangerous? -- It can be. Most people get over the flu on their own, without any lasting problems. But some people need to go to the hospital because of the flu. And some people even die from it. This is because the flu can cause a serious lung infection called pneumonia. That's why it's important to keep from getting the flu in the first place.  People at higher risk of getting very sick from the flu include:  · People 65 or older  · Young children (under 5 years old, and especially under 2 years old)  · Pregnant women  · People with certain other medical problems  If you or your child is in one of these groups, talk to a doctor or nurse. He or she can help you decide if you or your child needs treatment. In some cases, family members of a person with the flu might also need medicine to help prevent them from getting it.  Is there a test for flu? -- Yes. There are tests for the flu. In most cases, your doctor can tell if you have the flu by your " symptoms. But in some cases - for example, if you are at risk for having other problems caused by the flu - your doctor might do a test for flu.  How can I protect myself from the flu? -- You can:  · Wash your hands often with soap and water. The table has instructions on how to wash your hands to prevent spreading illness (table 1).  · Stay away from people you know are sick  · Get the flu vaccine every year - Some years the flu vaccine is more effective than others. But even in years when it is less effective, it still helps prevent some cases of the flu. It can also help keep you from getting severely ill if you do get the flu.  What should I do if I get the flu? -- If you think you have the flu, stay home, rest, and drink plenty of fluids. You can also take acetaminophen (sample brand name: Tylenol) to relieve fever and aches.  Do not give aspirin or medicines that contain aspirin to children younger than 18. In children, aspirin can cause a serious problem called Reye syndrome.  Most people with the flu get better on their own within 1 to 2 weeks. But you should call your doctor or nurse if you:  · Have trouble breathing or are short of breath  · Feel pain or pressure in your chest or belly  · Get suddenly dizzy  · Feel confused  · Have severe vomiting  Take your child to the doctor if he or she:  · Starts breathing fast or has trouble breathing  · Starts to turn blue or purple  · Is not drinking enough fluids  · Will not wake up or will not interact with you  · Is so unhappy that he or she does not want to be held  · Gets better from the flu but then gets sick again with a fever or cough  · Has a fever with a rash  If you decide to go to a walk-in clinic or a hospital because of the flu, tell someone right away why you are there. The staff might ask you to wear a mask or to wait someplace where you are less likely to spread your infection.  Whether or not you see a doctor or nurse, you should stay home while  you are sick with the flu, or keep your child home if he or she is sick. Do not go to work or school until your fever has been gone for at least 24 hours, without taking medicine such as acetaminophen. If you work with patients, such as in a hospital or clinic, you might need to stay home longer if you are still coughing. Also, always cover your mouth and nose with the inside of your elbow when you cough or sneeze.  Can the flu be treated? -- Yes, people with the flu can get medicines called antiviral medicines. These medicines can help people avoid some of the problems caused by the flu. Not every person with the flu needs an antiviral medicine, but some people do. Your doctor or nurse will decide if you need an antiviral medicine. Antibiotics do not work on the flu.  What if I am pregnant? -- The flu can be very dangerous for pregnant women. If you are pregnant, it is very important that you get the flu vaccine. You should also avoid taking care of anyone who has the flu.  If you are pregnant, call your doctor or nurse right away if:  · You might have been near someone with the flu.  · You think you might be coming down with the flu. In pregnant women, the symptoms of the flu can get worse very quickly. The flu can even cause trouble breathing or lead to death of the woman or her baby. That is why it is so important that you talk to doctor or nurse as soon as you notice any of the flu symptoms listed above. You will need an antiviral medicine if you are pregnant and have the flu.  All topics are updated as new evidence becomes available and our peer review process is complete.  This topic retrieved from [employer] on: Sep 21, 2021.  Topic 10639 Version 15.0  Release: 29.4.2 - C29.263  © 2021 UpToDate, Inc. and/or its affiliates. All rights reserved.  table 1: Hand washing to prevent spreading illness  · Wet your hands and put soap on them    · Rub your hands together for at least 20 seconds. Make sure to clean your  wrists, fingernails, and in between your fingers.    · Rinse your hands    · Dry your hands with a paper towel that you can throw away    If you are not near a sink, you can use a hand gel to clean your hands. The gels with at least 60 percent alcohol work the best. But it is better to wash with soap and water if you can.  Graphic 379072 Version 3.0  Consumer Information Use and Disclaimer   This information is not specific medical advice and does not replace information you receive from your health care provider. This is only a brief summary of general information. It does NOT include all information about conditions, illnesses, injuries, tests, procedures, treatments, therapies, discharge instructions or life-style choices that may apply to you. You must talk with your health care provider for complete information about your health and treatment options. This information should not be used to decide whether or not to accept your health care provider's advice, instructions or recommendations. Only your health care provider has the knowledge and training to provide advice that is right for you. The use of this information is governed by the [employer] End User License Agreement, available at https://www.[email or web address]/en/solutions/[employer]/about/nhung.The use of [employer] content is governed by the [employer] Terms of Use. ©2021 UpToDate, Inc. All rights reserved.  Copyright   © 2021 UpToDate, Inc. and/or its affiliates. All rights reserved.

## 2021-12-22 NOTE — LETTER
December 22, 2021      [facility]  [address]  [place] [address]  Phone: [phone]  Fax: [phone]       Patient: Aime Ricardo   YOB: 1995  Date of Visit: 12/22/2021    To Whom It May Concern:    Robert Ricardo  was at [facility] on 12/22/2021. The patient may return to work/school on 12/26/21 with no restrictions. If you have any questions or concerns, or if I can be of further assistance, please do not hesitate to contact me.    Sincerely,    Sommer Purdy, NP

## 2021-12-22 NOTE — PROGRESS NOTES
Disclaimer:  This note is prepared using voice recognition software and as such is likely to have errors and has not been proof read. Please contact me for questions.    Subjective:      Patient ID: Aime Ricardo is a 26 y.o. male.    Chief Complaint: Influenza    Influenza  This is a new problem. The current episode started in the past 7 days (3 days ago). The problem occurs constantly. The problem has been gradually worsening. Associated symptoms include chills, congestion, coughing (mild, dry), fatigue, headaches and a sore throat (intermittent). Pertinent negatives include no fever, nausea or vomiting. Nothing aggravates the symptoms. Treatments tried: DayQuil.       Review of Systems   Constitutional: Positive for chills and fatigue. Negative for fever.   HENT: Positive for congestion, sinus pressure and sore throat (intermittent).    Respiratory: Positive for cough (mild, dry).    Cardiovascular: Negative.    Gastrointestinal: Negative.  Negative for nausea and vomiting.   Genitourinary: Negative.    Musculoskeletal: Negative.    Neurological: Positive for headaches.   Hematological: Negative.        Objective:   /80   Pulse 63   Temp 97.8 °F (36.6 °C)   Wt 91.3 kg (201 lb 6.2 oz)   SpO2 97%   BMI 25.86 kg/m²   Physical Exam  Vitals and nursing note reviewed.   Constitutional:       General: He is active. He is not in acute distress.     Appearance: Normal appearance. He is well-developed and well-nourished. He is not toxic-appearing or diaphoretic.   HENT:      Head: Normocephalic and atraumatic.      Right Ear: Ear canal and external ear normal. A middle ear effusion is present.      Left Ear: Ear canal and external ear normal. A middle ear effusion is present.      Nose: Sinus tenderness and mucosal edema present.      Mouth/Throat:      Mouth: Mucous membranes are normal. Mucous membranes are moist.      Pharynx: Uvula midline. Posterior oropharyngeal erythema present. No oropharyngeal  exudate or posterior oropharyngeal edema.   Eyes:      General:         Right eye: No discharge.         Left eye: No discharge.      Conjunctiva/sclera: Conjunctivae normal.   Cardiovascular:      Rate and Rhythm: Normal rate and regular rhythm.      Heart sounds: Normal heart sounds. No murmur heard.      Pulmonary:      Effort: Pulmonary effort is normal.      Breath sounds: Normal breath sounds. No wheezing.   Musculoskeletal:         General: Normal range of motion.      Cervical back: Normal range of motion and neck supple.   Lymphadenopathy:      Cervical: No cervical adenopathy.   Skin:     General: Skin is warm and dry.      Findings: No rash.   Neurological:      General: No focal deficit present.      Mental Status: He is alert and oriented to person, place, and time.   Psychiatric:         Mood and Affect: Mood normal.         Behavior: Behavior normal. Behavior is cooperative.         Thought Content: Thought content normal.         Judgment: Judgment normal.       Assessment:     1. Influenza-like illness       Plan:   Influenza-like illness  -     oseltamivir (TAMIFLU) 75 MG capsule; Take 1 capsule (75 mg total) by mouth 2 (two) times daily. for 5 days  Dispense: 10 capsule; Refill: 0  -     promethazine-dextromethorphan (PROMETHAZINE-DM) 6.25-15 mg/5 mL Syrp; Take 5 mLs by mouth every 8 (eight) hours as needed (cough).  Dispense: 180 mL; Refill: 0  -     benzonatate (TESSALON) 200 MG capsule; Take 1 capsule (200 mg total) by mouth 3 (three) times daily as needed for Cough.  Dispense: 30 capsule; Refill: 0  -     dexbrompheniramine-phenyleph (ALAHIST PE) 2-7.5 mg Tab; Take 1 tablet by mouth 4 (four) times daily as needed (congestion).  Dispense: 30 tablet; Refill: 0          Follow up if symptoms worsen or fail to improve.    Patient Instructions     Patient Education       Flu   The Basics   Written by the doctors and editors at [facility]   What is the flu? -- The flu is an infection that can cause  "fever, cough, body aches, and other symptoms. The most common type of flu is the "seasonal" flu. There are different forms of seasonal flu, for example, "type A" and "type B."  All forms of the flu are caused by viruses. The medical term for the flu is "influenza."  What are the other types of flu? -- Besides seasonal flu, there is also the "swine" flu, which caused a worldwide outbreak ("pandemic") in 2009 and 2010, and the bird flu. Bird flu (also known as "aditya flu") is a severe form of the flu that is caused by a type of flu virus that first infected birds.  What are the most common flu symptoms? -- All forms of the flu can cause:  · Fever (temperature higher than 100ºF or 37.8ºC)  · Extreme tiredness  · Headache or body aches  · Cough  · Sore throat  · Runny nose  Flu symptoms can come on very suddenly.  Is the flu dangerous? -- It can be. Most people get over the flu on their own, without any lasting problems. But some people need to go to the hospital because of the flu. And some people even die from it. This is because the flu can cause a serious lung infection called pneumonia. That's why it's important to keep from getting the flu in the first place.  People at higher risk of getting very sick from the flu include:  · People 65 or older  · Young children (under 5 years old, and especially under 2 years old)  · Pregnant women  · People with certain other medical problems  If you or your child is in one of these groups, talk to a doctor or nurse. He or she can help you decide if you or your child needs treatment. In some cases, family members of a person with the flu might also need medicine to help prevent them from getting it.  Is there a test for flu? -- Yes. There are tests for the flu. In most cases, your doctor can tell if you have the flu by your symptoms. But in some cases - for example, if you are at risk for having other problems caused by the flu - your doctor might do a test for flu.  How can I " protect myself from the flu? -- You can:  · Wash your hands often with soap and water. The table has instructions on how to wash your hands to prevent spreading illness (table 1).  · Stay away from people you know are sick  · Get the flu vaccine every year - Some years the flu vaccine is more effective than others. But even in years when it is less effective, it still helps prevent some cases of the flu. It can also help keep you from getting severely ill if you do get the flu.  What should I do if I get the flu? -- If you think you have the flu, stay home, rest, and drink plenty of fluids. You can also take acetaminophen (sample brand name: Tylenol) to relieve fever and aches.  Do not give aspirin or medicines that contain aspirin to children younger than 18. In children, aspirin can cause a serious problem called Reye syndrome.  Most people with the flu get better on their own within 1 to 2 weeks. But you should call your doctor or nurse if you:  · Have trouble breathing or are short of breath  · Feel pain or pressure in your chest or belly  · Get suddenly dizzy  · Feel confused  · Have severe vomiting  Take your child to the doctor if he or she:  · Starts breathing fast or has trouble breathing  · Starts to turn blue or purple  · Is not drinking enough fluids  · Will not wake up or will not interact with you  · Is so unhappy that he or she does not want to be held  · Gets better from the flu but then gets sick again with a fever or cough  · Has a fever with a rash  If you decide to go to a walk-in clinic or a hospital because of the flu, tell someone right away why you are there. The staff might ask you to wear a mask or to wait someplace where you are less likely to spread your infection.  Whether or not you see a doctor or nurse, you should stay home while you are sick with the flu, or keep your child home if he or she is sick. Do not go to work or school until your fever has been gone for at least 24 hours,  without taking medicine such as acetaminophen. If you work with patients, such as in a hospital or clinic, you might need to stay home longer if you are still coughing. Also, always cover your mouth and nose with the inside of your elbow when you cough or sneeze.  Can the flu be treated? -- Yes, people with the flu can get medicines called antiviral medicines. These medicines can help people avoid some of the problems caused by the flu. Not every person with the flu needs an antiviral medicine, but some people do. Your doctor or nurse will decide if you need an antiviral medicine. Antibiotics do not work on the flu.  What if I am pregnant? -- The flu can be very dangerous for pregnant women. If you are pregnant, it is very important that you get the flu vaccine. You should also avoid taking care of anyone who has the flu.  If you are pregnant, call your doctor or nurse right away if:  · You might have been near someone with the flu.  · You think you might be coming down with the flu. In pregnant women, the symptoms of the flu can get worse very quickly. The flu can even cause trouble breathing or lead to death of the woman or her baby. That is why it is so important that you talk to doctor or nurse as soon as you notice any of the flu symptoms listed above. You will need an antiviral medicine if you are pregnant and have the flu.  All topics are updated as new evidence becomes available and our peer review process is complete.  This topic retrieved from [employer] on: Sep 21, 2021.  Topic 38763 Version 15.0  Release: 29.4.2 - C29.263  © 2021 UpToDate, Inc. and/or its affiliates. All rights reserved.  table 1: Hand washing to prevent spreading illness  · Wet your hands and put soap on them    · Rub your hands together for at least 20 seconds. Make sure to clean your wrists, fingernails, and in between your fingers.    · Rinse your hands    · Dry your hands with a paper towel that you can throw away    If you are not  near a sink, you can use a hand gel to clean your hands. The gels with at least 60 percent alcohol work the best. But it is better to wash with soap and water if you can.  Graphic 543682 Version 3.0  Consumer Information Use and Disclaimer   This information is not specific medical advice and does not replace information you receive from your health care provider. This is only a brief summary of general information. It does NOT include all information about conditions, illnesses, injuries, tests, procedures, treatments, therapies, discharge instructions or life-style choices that may apply to you. You must talk with your health care provider for complete information about your health and treatment options. This information should not be used to decide whether or not to accept your health care provider's advice, instructions or recommendations. Only your health care provider has the knowledge and training to provide advice that is right for you. The use of this information is governed by the [employer] End User License Agreement, available at https://www.[email or web address]/solutions/[employer]/about/nhung.The use of [employer] content is governed by the [employer] Terms of Use. ©2021 UpToDate, Inc. All rights reserved.  Copyright   © 2021 UpToDate, Inc. and/or its affiliates. All rights reserved.

## 2022-03-02 ENCOUNTER — OFFICE VISIT (OUTPATIENT)
Dept: FAMILY MEDICINE | Facility: CLINIC | Age: 27
End: 2022-03-02
Payer: COMMERCIAL

## 2022-03-02 DIAGNOSIS — J06.9 UPPER RESPIRATORY TRACT INFECTION, UNSPECIFIED TYPE: Primary | ICD-10-CM

## 2022-03-02 PROCEDURE — 99213 OFFICE O/P EST LOW 20 MIN: CPT | Mod: 95,,, | Performed by: NURSE PRACTITIONER

## 2022-03-02 PROCEDURE — 99213 PR OFFICE/OUTPT VISIT, EST, LEVL III, 20-29 MIN: ICD-10-PCS | Mod: 95,,, | Performed by: NURSE PRACTITIONER

## 2022-03-02 RX ORDER — FEXOFENADINE HCL AND PSEUDOEPHEDRINE HCI 180; 240 MG/1; MG/1
1 TABLET, EXTENDED RELEASE ORAL DAILY
Qty: 10 TABLET | Refills: 0 | Status: SHIPPED | OUTPATIENT
Start: 2022-03-02 | End: 2022-03-12

## 2022-03-02 RX ORDER — FLUTICASONE PROPIONATE 50 MCG
2 SPRAY, SUSPENSION (ML) NASAL DAILY
Qty: 16 G | Refills: 0 | Status: SHIPPED | OUTPATIENT
Start: 2022-03-02 | End: 2023-01-04

## 2022-03-02 RX ORDER — METHYLPREDNISOLONE 4 MG/1
TABLET ORAL
Qty: 21 EACH | Refills: 0 | Status: SHIPPED | OUTPATIENT
Start: 2022-03-02 | End: 2022-03-23

## 2022-03-02 NOTE — LETTER
March 2, 2022      [facility]  [address]  [facility] [address]  Phone: [phone]  Fax: [phone]       Patient: Aime Ricardo   YOB: 1995  Date of Visit: 03/02/2022    To Whom It May Concern:    Robert Ricardo  was at [facility] on 03/02/2022. The patient may return to work on 3/3/22 with no restrictions. If you have any questions or concerns, or if I can be of further assistance, please do not hesitate to contact me.    Sincerely,    Alexandra Dhillon MA

## 2022-03-02 NOTE — PROGRESS NOTES
Subjective:       Patient ID: Aime Ricardo is a 26 y.o. male.    Chief Complaint: No chief complaint on file.  The patient location is: [place]   The chief complaint leading to consultation is: sore throat    Visit type: audiovisual    Face to Face time with patient: 10  10 minutes of total time spent on the encounter, which includes face to face time and non-face to face time preparing to see the patient (eg, review of tests), Obtaining and/or reviewing separately obtained history, Documenting clinical information in the electronic or other health record, Independently interpreting results (not separately reported) and communicating results to the patient/family/caregiver, or Care coordination (not separately reported).         Each patient to whom he or she provides medical services by telemedicine is:  (1) informed of the relationship between the physician and patient and the respective role of any other health care provider with respect to management of the patient; and (2) notified that he or she may decline to receive medical services by telemedicine and may withdraw from such care at any time.    Notes: Pt reports to tele medicine with chief complaint of sore throat and mild congestion.  Onset 1 day ago.  Denies fever and chills.  Possible exposures @ [employer].  Pt reports significant other with similar symptoms 1 week ago.  Negative home covid test  Sore Throat   This is a new problem. The current episode started yesterday. The problem has been gradually worsening. Neither side of throat is experiencing more pain than the other. There has been no fever. The pain is at a severity of 3/10. The pain is mild. Associated symptoms include a hoarse voice. Pertinent negatives include no abdominal pain, congestion, coughing, diarrhea, drooling, ear discharge, ear pain, headaches, plugged ear sensation, neck pain, shortness of breath, stridor, swollen glands, trouble swallowing or vomiting. He has tried  nothing for the symptoms.     Review of Systems   HENT: Positive for hoarse voice and sore throat. Negative for congestion, drooling, ear discharge, ear pain and trouble swallowing.    Respiratory: Negative for cough, shortness of breath and stridor.    Gastrointestinal: Negative for abdominal pain, diarrhea and vomiting.   Musculoskeletal: Negative for neck pain.   Neurological: Negative for headaches.       Objective:      Physical Exam  HENT:      Head: Normocephalic.      Right Ear: External ear normal.      Left Ear: External ear normal.      Mouth/Throat:      Pharynx: No oropharyngeal exudate.   Eyes:      Extraocular Movements: Extraocular movements intact.   Pulmonary:      Effort: Pulmonary effort is normal. No respiratory distress.   Musculoskeletal:      Cervical back: Normal range of motion.   Skin:     Coloration: Skin is not jaundiced.   Neurological:      Mental Status: He is alert and oriented to person, place, and time.   Psychiatric:         Behavior: Behavior normal.         Assessment:       1. Upper respiratory tract infection, unspecified type        Plan:   Upper respiratory tract infection, unspecified type  -     fluticasone propionate (FLONASE) 50 mcg/actuation nasal spray; 2 sprays (100 mcg total) by Each Nostril route once daily.  Dispense: 16 g; Refill: 0  -     methylPREDNISolone (MEDROL DOSEPACK) 4 mg tablet; use as directed  Dispense: 21 each; Refill: 0  -     fexofenadine-pseudoephedrine (ALLEGRA-D 24) 180-240 mg per 24 hr tablet; Take 1 tablet by mouth once daily. for 10 days  Dispense: 10 tablet; Refill: 0      No follow-ups on file.

## 2022-06-21 ENCOUNTER — OFFICE VISIT (OUTPATIENT)
Dept: INTERNAL MEDICINE | Facility: CLINIC | Age: 27
End: 2022-06-21
Payer: COMMERCIAL

## 2022-06-21 ENCOUNTER — PATIENT MESSAGE (OUTPATIENT)
Dept: INTERNAL MEDICINE | Facility: CLINIC | Age: 27
End: 2022-06-21

## 2022-06-21 VITALS
WEIGHT: 198.44 LBS | HEIGHT: 74 IN | BODY MASS INDEX: 25.47 KG/M2 | HEART RATE: 90 BPM | OXYGEN SATURATION: 97 % | DIASTOLIC BLOOD PRESSURE: 86 MMHG | TEMPERATURE: 98 F | SYSTOLIC BLOOD PRESSURE: 128 MMHG

## 2022-06-21 DIAGNOSIS — J06.9 VIRAL URI WITH COUGH: Primary | ICD-10-CM

## 2022-06-21 LAB
CTP QC/QA: YES
CTP QC/QA: YES
FLUAV AG NPH QL: NEGATIVE
FLUBV AG NPH QL: NEGATIVE
SARS-COV-2 RDRP RESP QL NAA+PROBE: NEGATIVE

## 2022-06-21 PROCEDURE — 99999 PR PBB SHADOW E&M-EST. PATIENT-LVL III: ICD-10-PCS | Mod: PBBFAC,,, | Performed by: PHYSICIAN ASSISTANT

## 2022-06-21 PROCEDURE — 3008F BODY MASS INDEX DOCD: CPT | Mod: CPTII,S$GLB,, | Performed by: PHYSICIAN ASSISTANT

## 2022-06-21 PROCEDURE — 99999 PR PBB SHADOW E&M-EST. PATIENT-LVL III: CPT | Mod: PBBFAC,,, | Performed by: PHYSICIAN ASSISTANT

## 2022-06-21 PROCEDURE — 3008F PR BODY MASS INDEX (BMI) DOCUMENTED: ICD-10-PCS | Mod: CPTII,S$GLB,, | Performed by: PHYSICIAN ASSISTANT

## 2022-06-21 PROCEDURE — 1159F MED LIST DOCD IN RCRD: CPT | Mod: CPTII,S$GLB,, | Performed by: PHYSICIAN ASSISTANT

## 2022-06-21 PROCEDURE — 3074F SYST BP LT 130 MM HG: CPT | Mod: CPTII,S$GLB,, | Performed by: PHYSICIAN ASSISTANT

## 2022-06-21 PROCEDURE — 99214 PR OFFICE/OUTPT VISIT, EST, LEVL IV, 30-39 MIN: ICD-10-PCS | Mod: S$GLB,,, | Performed by: PHYSICIAN ASSISTANT

## 2022-06-21 PROCEDURE — 87804 POCT INFLUENZA A/B: ICD-10-PCS | Mod: 59,QW,S$GLB, | Performed by: PHYSICIAN ASSISTANT

## 2022-06-21 PROCEDURE — 3074F PR MOST RECENT SYSTOLIC BLOOD PRESSURE < 130 MM HG: ICD-10-PCS | Mod: CPTII,S$GLB,, | Performed by: PHYSICIAN ASSISTANT

## 2022-06-21 PROCEDURE — U0002: ICD-10-PCS | Mod: QW,S$GLB,, | Performed by: PHYSICIAN ASSISTANT

## 2022-06-21 PROCEDURE — 1159F PR MEDICATION LIST DOCUMENTED IN MEDICAL RECORD: ICD-10-PCS | Mod: CPTII,S$GLB,, | Performed by: PHYSICIAN ASSISTANT

## 2022-06-21 PROCEDURE — 87804 INFLUENZA ASSAY W/OPTIC: CPT | Mod: QW,S$GLB,, | Performed by: PHYSICIAN ASSISTANT

## 2022-06-21 PROCEDURE — U0002 COVID-19 LAB TEST NON-CDC: HCPCS | Mod: QW,S$GLB,, | Performed by: PHYSICIAN ASSISTANT

## 2022-06-21 PROCEDURE — 1160F PR REVIEW ALL MEDS BY PRESCRIBER/CLIN PHARMACIST DOCUMENTED: ICD-10-PCS | Mod: CPTII,S$GLB,, | Performed by: PHYSICIAN ASSISTANT

## 2022-06-21 PROCEDURE — 1160F RVW MEDS BY RX/DR IN RCRD: CPT | Mod: CPTII,S$GLB,, | Performed by: PHYSICIAN ASSISTANT

## 2022-06-21 PROCEDURE — 99214 OFFICE O/P EST MOD 30 MIN: CPT | Mod: S$GLB,,, | Performed by: PHYSICIAN ASSISTANT

## 2022-06-21 PROCEDURE — 3079F PR MOST RECENT DIASTOLIC BLOOD PRESSURE 80-89 MM HG: ICD-10-PCS | Mod: CPTII,S$GLB,, | Performed by: PHYSICIAN ASSISTANT

## 2022-06-21 PROCEDURE — 3079F DIAST BP 80-89 MM HG: CPT | Mod: CPTII,S$GLB,, | Performed by: PHYSICIAN ASSISTANT

## 2022-06-21 RX ORDER — METHYLPREDNISOLONE 4 MG/1
TABLET ORAL
Qty: 1 EACH | Refills: 0 | Status: SHIPPED | OUTPATIENT
Start: 2022-06-21 | End: 2023-01-04

## 2022-06-21 NOTE — PROGRESS NOTES
Subjective:      Patient ID: Aime Ricardo is a 26 y.o. male.    Chief Complaint: Sinusitis    3 weeks has mold in his apt    URI   This is a new problem. The current episode started in the past 7 days. The problem has been gradually worsening. There has been no fever. Associated symptoms include congestion, ear pain, headaches, a plugged ear sensation, rhinorrhea and a sore throat. Pertinent negatives include no abdominal pain, chest pain, coughing, diarrhea, dysuria, joint pain, joint swelling, nausea, neck pain, rash, sinus pain, sneezing, swollen glands, vomiting or wheezing. Treatments tried: cough drops and throat lozenge  The treatment provided mild relief.   Took covid test and it was negative.     Review of Systems   Constitutional: Negative for activity change, appetite change, chills, diaphoresis, fatigue, fever and unexpected weight change.   HENT: Positive for congestion, ear pain, postnasal drip, rhinorrhea and sore throat. Negative for dental problem, drooling, ear discharge, facial swelling, hearing loss, mouth sores, nosebleeds, sinus pressure, sinus pain, sneezing and trouble swallowing.    Eyes: Negative for pain, discharge, redness, itching and visual disturbance.   Respiratory: Negative for cough, chest tightness, shortness of breath and wheezing.    Cardiovascular: Negative for chest pain, palpitations and leg swelling.   Gastrointestinal: Negative for abdominal pain, constipation, diarrhea, nausea and vomiting.   Endocrine: Negative.    Genitourinary: Negative.  Negative for difficulty urinating and dysuria.   Musculoskeletal: Positive for myalgias. Negative for joint pain, neck pain and neck stiffness.   Skin: Negative for rash.   Allergic/Immunologic: Negative for environmental allergies, food allergies and immunocompromised state.   Neurological: Positive for headaches. Negative for dizziness and weakness.     Objective:   /86 (BP Location: Left arm, Patient Position: Sitting,  "BP Method: Medium (Manual))   Pulse 90   Temp 98.3 °F (36.8 °C) (Tympanic)   Ht 6' 2" (1.88 m)   Wt 90 kg (198 lb 6.6 oz)   SpO2 97%   BMI 25.47 kg/m²     Physical Exam  Vitals reviewed.   Constitutional:       General: He is not in acute distress.     Appearance: Normal appearance. He is well-developed. He is not ill-appearing, toxic-appearing or diaphoretic.   HENT:      Head: Normocephalic and atraumatic.      Right Ear: Tympanic membrane, ear canal and external ear normal.      Left Ear: Tympanic membrane, ear canal and external ear normal.      Nose: Congestion and rhinorrhea present.   Eyes:      Conjunctiva/sclera: Conjunctivae normal.      Pupils: Pupils are equal, round, and reactive to light.   Cardiovascular:      Rate and Rhythm: Normal rate and regular rhythm.      Heart sounds: Normal heart sounds. No murmur heard.    No friction rub. No gallop.   Pulmonary:      Effort: Pulmonary effort is normal. No respiratory distress.      Breath sounds: Normal breath sounds. No wheezing or rales.   Chest:      Chest wall: No tenderness.   Abdominal:      General: There is no distension.      Palpations: Abdomen is soft.      Tenderness: There is no abdominal tenderness.   Musculoskeletal:         General: Normal range of motion.      Cervical back: Normal range of motion and neck supple.   Lymphadenopathy:      Cervical: No cervical adenopathy.   Skin:     General: Skin is warm and dry.   Neurological:      Mental Status: He is alert and oriented to person, place, and time.   Psychiatric:         Mood and Affect: Mood normal.         Behavior: Behavior normal.         Thought Content: Thought content normal.         Judgment: Judgment normal.       Office Visit on 06/21/2022   Component Date Value Ref Range Status    Rapid Influenza A Ag 06/21/2022 Negative  Negative Final    Rapid Influenza B Ag 06/21/2022 Negative  Negative Final     Acceptable 06/21/2022 Yes   Final    POC Rapid COVID " 06/21/2022 Negative  Negative Final     Acceptable 06/21/2022 Yes   Final       Assessment:     1. Viral URI with cough      Plan:   Viral URI with cough  -     POCT Influenza A/B  -     POCT COVID-19 Rapid Screening  -     methylPREDNISolone (MEDROL DOSEPACK) 4 mg tablet; use as directed  Dispense: 1 each; Refill: 0    -otc symptomatic relief medications around the clock    Follow up if symptoms worsen or fail to improve.

## 2022-07-20 ENCOUNTER — OFFICE VISIT (OUTPATIENT)
Dept: INTERNAL MEDICINE | Facility: CLINIC | Age: 27
End: 2022-07-20
Payer: COMMERCIAL

## 2022-07-20 DIAGNOSIS — S09.90XA INJURY OF HEAD, INITIAL ENCOUNTER: Primary | ICD-10-CM

## 2022-07-20 DIAGNOSIS — R51.9 NONINTRACTABLE HEADACHE, UNSPECIFIED CHRONICITY PATTERN, UNSPECIFIED HEADACHE TYPE: ICD-10-CM

## 2022-07-20 PROCEDURE — 99213 PR OFFICE/OUTPT VISIT, EST, LEVL III, 20-29 MIN: ICD-10-PCS | Mod: 95,,, | Performed by: NURSE PRACTITIONER

## 2022-07-20 PROCEDURE — 99213 OFFICE O/P EST LOW 20 MIN: CPT | Mod: 95,,, | Performed by: NURSE PRACTITIONER

## 2022-07-20 PROCEDURE — 1160F RVW MEDS BY RX/DR IN RCRD: CPT | Mod: CPTII,95,, | Performed by: NURSE PRACTITIONER

## 2022-07-20 PROCEDURE — 1160F PR REVIEW ALL MEDS BY PRESCRIBER/CLIN PHARMACIST DOCUMENTED: ICD-10-PCS | Mod: CPTII,95,, | Performed by: NURSE PRACTITIONER

## 2022-07-20 PROCEDURE — 1159F MED LIST DOCD IN RCRD: CPT | Mod: CPTII,95,, | Performed by: NURSE PRACTITIONER

## 2022-07-20 PROCEDURE — 1159F PR MEDICATION LIST DOCUMENTED IN MEDICAL RECORD: ICD-10-PCS | Mod: CPTII,95,, | Performed by: NURSE PRACTITIONER

## 2022-07-20 NOTE — PROGRESS NOTES
Subjective:       Patient ID: Aime Ricardo is a 26 y.o. male.    Chief Complaint: Head Injury    The patient location is: home  The chief complaint leading to consultation is: head injury    Visit type: audiovisual    Face to Face time with patient: 15 minutes  20 minutes of total time spent on the encounter, which includes face to face time and non-face to face time preparing to see the patient (eg, review of tests), Obtaining and/or reviewing separately obtained history, Documenting clinical information in the electronic or other health record, Independently interpreting results (not separately reported) and communicating results to the patient/family/caregiver, or Care coordination (not separately reported).         Each patient to whom he or she provides medical services by telemedicine is:  (1) informed of the relationship between the physician and patient and the respective role of any other health care provider with respect to management of the patient; and (2) notified that he or she may decline to receive medical services by telemedicine and may withdraw from such care at any time.    Notes:   Patient doing virtual for head injury  Hit his head on the car when he was getting in it  He ws getting in the drivers seat and closing door at the same time. He hit the right side/posterior part of his head on the car.   No loss of consciousness   He had initial pain but then a dull headache that started within 1-2 hours after  Still has a dull headache pain 2-3/10  started with nausea as well  No sensitivity to light or sound  He has not taken anything for the pain  He feels area is mildly swollen where he hit it    Head Injury   Associated symptoms include headaches. Pertinent negatives include no vomiting.       There were no vitals taken for this visit.    Review of Systems   Constitutional: Negative for activity change and unexpected weight change.   HENT: Negative for hearing loss, rhinorrhea and trouble  swallowing.    Eyes: Positive for visual disturbance. Negative for discharge.   Respiratory: Negative for chest tightness and wheezing.    Cardiovascular: Negative for chest pain and palpitations.   Gastrointestinal: Negative for blood in stool, constipation, diarrhea and vomiting.   Endocrine: Negative for polydipsia and polyuria.   Genitourinary: Negative for difficulty urinating, hematuria and urgency.   Musculoskeletal: Negative for arthralgias, joint swelling and neck pain.   Neurological: Positive for headaches.   Psychiatric/Behavioral: Positive for confusion. Negative for dysphoric mood.       Objective:      Physical Exam  Constitutional:       General: He is not in acute distress.     Appearance: He is well-developed. He is not ill-appearing, toxic-appearing or diaphoretic.   HENT:      Head: Normocephalic and atraumatic.      Right Ear: External ear normal.      Left Ear: External ear normal.      Nose: Nose normal.   Eyes:      General: Lids are normal. No scleral icterus.        Right eye: No discharge.         Left eye: No discharge.      Conjunctiva/sclera: Conjunctivae normal.   Pulmonary:      Effort: Pulmonary effort is normal. No tachypnea, accessory muscle usage or respiratory distress.      Breath sounds: No stridor.   Musculoskeletal:      Cervical back: Full passive range of motion without pain.   Skin:     Coloration: Skin is not pale.   Neurological:      Mental Status: He is alert. He is not disoriented.   Psychiatric:         Attention and Perception: He is attentive.         Mood and Affect: Mood is not anxious or depressed. Affect is not labile, blunt, angry or inappropriate.         Speech: Speech normal.         Behavior: Behavior normal.         Thought Content: Thought content normal.         Judgment: Judgment normal.         Assessment:       1. Injury of head, initial encounter    2. Nonintractable headache, unspecified chronicity pattern, unspecified headache type        Plan:        Aime was seen today for head injury.    Diagnoses and all orders for this visit:    Injury of head, initial encounter    Nonintractable headache, unspecified chronicity pattern, unspecified headache type    reassured patient  Tylenol for pain  Cool compresses  No screen time today  Emergency Room if vomiting, confusion, worsening headache

## 2022-07-21 ENCOUNTER — OFFICE VISIT (OUTPATIENT)
Dept: URGENT CARE | Facility: CLINIC | Age: 27
End: 2022-07-21
Payer: COMMERCIAL

## 2022-07-21 VITALS
HEART RATE: 78 BPM | BODY MASS INDEX: 25.03 KG/M2 | DIASTOLIC BLOOD PRESSURE: 83 MMHG | TEMPERATURE: 98 F | OXYGEN SATURATION: 99 % | SYSTOLIC BLOOD PRESSURE: 140 MMHG | WEIGHT: 195 LBS | HEIGHT: 74 IN

## 2022-07-21 DIAGNOSIS — S06.0X0A CONCUSSION WITHOUT LOSS OF CONSCIOUSNESS, INITIAL ENCOUNTER: Primary | ICD-10-CM

## 2022-07-21 PROCEDURE — 1159F MED LIST DOCD IN RCRD: CPT | Mod: CPTII,S$GLB,, | Performed by: INTERNAL MEDICINE

## 2022-07-21 PROCEDURE — 3079F PR MOST RECENT DIASTOLIC BLOOD PRESSURE 80-89 MM HG: ICD-10-PCS | Mod: CPTII,S$GLB,, | Performed by: INTERNAL MEDICINE

## 2022-07-21 PROCEDURE — 1159F PR MEDICATION LIST DOCUMENTED IN MEDICAL RECORD: ICD-10-PCS | Mod: CPTII,S$GLB,, | Performed by: INTERNAL MEDICINE

## 2022-07-21 PROCEDURE — 3077F SYST BP >= 140 MM HG: CPT | Mod: CPTII,S$GLB,, | Performed by: INTERNAL MEDICINE

## 2022-07-21 PROCEDURE — 3008F BODY MASS INDEX DOCD: CPT | Mod: CPTII,S$GLB,, | Performed by: INTERNAL MEDICINE

## 2022-07-21 PROCEDURE — 99215 OFFICE O/P EST HI 40 MIN: CPT | Mod: S$GLB,,, | Performed by: INTERNAL MEDICINE

## 2022-07-21 PROCEDURE — 3008F PR BODY MASS INDEX (BMI) DOCUMENTED: ICD-10-PCS | Mod: CPTII,S$GLB,, | Performed by: INTERNAL MEDICINE

## 2022-07-21 PROCEDURE — 99215 PR OFFICE/OUTPT VISIT, EST, LEVL V, 40-54 MIN: ICD-10-PCS | Mod: S$GLB,,, | Performed by: INTERNAL MEDICINE

## 2022-07-21 PROCEDURE — 3077F PR MOST RECENT SYSTOLIC BLOOD PRESSURE >= 140 MM HG: ICD-10-PCS | Mod: CPTII,S$GLB,, | Performed by: INTERNAL MEDICINE

## 2022-07-21 PROCEDURE — 3079F DIAST BP 80-89 MM HG: CPT | Mod: CPTII,S$GLB,, | Performed by: INTERNAL MEDICINE

## 2022-07-21 RX ORDER — ONDANSETRON 4 MG/1
4 TABLET, FILM COATED ORAL EVERY 8 HOURS PRN
Qty: 15 TABLET | Refills: 0 | Status: SHIPPED | OUTPATIENT
Start: 2022-07-21 | End: 2023-01-04

## 2022-07-21 RX ORDER — DEXTROAMPHETAMINE SACCHARATE, AMPHETAMINE ASPARTATE MONOHYDRATE, DEXTROAMPHETAMINE SULFATE AND AMPHETAMINE SULFATE 5; 5; 5; 5 MG/1; MG/1; MG/1; MG/1
20 CAPSULE, EXTENDED RELEASE ORAL EVERY MORNING
COMMUNITY
Start: 2022-01-01

## 2022-07-21 NOTE — PATIENT INSTRUCTIONS
Go to ER for any worsening symptoms after leaving urgent care.     REST. Decrease audiovisual stimuli/screen time. Use tylenol for headache, use zofran for nausea. If you develop worsening nausea/vomiting, go to ER immediately.

## 2022-07-21 NOTE — LETTER
July 21, 2022      [facility]  [address]  [facility] [place] [address]  Phone: [phone]       Patient: Aime Ricardo   YOB: 1995  Date of Visit: 07/21/2022    To Whom It May Concern:    Robert Ricardo  was at [facility] on 07/21/2022. He may return to work/school on 07/25/2022 with no restrictions. If you have any questions or concerns, or if I can be of further assistance, please do not hesitate to contact me.    Sincerely,    Kellen Plunkett MA

## 2022-07-21 NOTE — PROGRESS NOTES
"Subjective:       Patient ID: Aime Ricardo is a 26 y.o. male.    Vitals:  height is 6' 2" (1.88 m) and weight is 88.5 kg (195 lb). His temperature is 98 °F (36.7 °C). His blood pressure is 140/83 (abnormal) and his pulse is 78. His oxygen saturation is 99%.     Chief Complaint: Injury (I hit my head yesterday morning. Continuing headache and I feel nauseous - Entered by patient) and Head Injury    Patient advised while getting in vehicle on yesterday, he hit the right side of his head.     Injury  This is a new problem. The current episode started yesterday. The problem has been unchanged. Associated symptoms include headaches, nausea and a visual change. Pertinent negatives include no anorexia, arthralgias, change in bowel habit, chest pain, chills, congestion, coughing, diaphoresis, fatigue, fever, joint swelling, myalgias, neck pain, numbness, rash, sore throat, swollen glands, vertigo, vomiting or weakness. Nothing aggravates the symptoms. He has tried nothing for the symptoms. The treatment provided no relief.   Head Injury   Associated symptoms include headaches. Pertinent negatives include no numbness, vomiting or weakness.     Patient hit head on the right side of his head yesterday at 4 am while getting into his vehicle. Has had headache and nausea worsening after going to work today. Slept fine overnight. Works as a  for local [employer]. He denies episodes of vomiting, numbness, blurry vision. He did not lose consciousness. Has no amnesia. Denies rhinorrhea      Constitution: Negative for chills, sweating, fatigue and fever.   HENT: Negative for congestion and sore throat.    Neck: Negative for neck pain.   Cardiovascular: Negative for chest pain.   Respiratory: Negative for cough.    Gastrointestinal: Positive for nausea. Negative for vomiting.   Musculoskeletal: Negative for joint pain, joint swelling and muscle ache.   Skin: Negative for rash and erythema.   Neurological: Positive for " headaches. Negative for history of vertigo and numbness.       Objective:      Physical Exam   Constitutional: He is oriented to person, place, and time. He appears well-developed. No distress.   HENT:   Head: Normocephalic and atraumatic.   Ears:   Right Ear: External ear normal. No hemotympanum.   Left Ear: External ear normal. No hemotympanum.   Nose: Nose normal.   Mouth/Throat: Oropharynx is clear and moist. No oropharyngeal exudate.   No rhinorrhea      Comments: No rhinorrhea  Eyes: Conjunctivae and EOM are normal. Pupils are equal, round, and reactive to light. No visual field deficit is present. Right eye exhibits no discharge. Left eye exhibits no discharge. No scleral icterus.      Comments: No raccoon eyes   Neck: Neck supple.   Cardiovascular: Normal rate, regular rhythm and normal heart sounds.   No murmur heard.Exam reveals no gallop and no friction rub.   Pulmonary/Chest: Effort normal. No respiratory distress. He has no wheezes. He has no rales.   Abdominal: Bowel sounds are normal. He exhibits no distension. Soft.   Lymphadenopathy:     He has no cervical adenopathy.   Neurological: He is alert and oriented to person, place, and time. He has normal motor skills and normal sensation. He displays no weakness, no atrophy, no tremor, facial symmetry and no dysarthria. No cranial nerve deficit or sensory deficit. He exhibits normal muscle tone. He has a normal Finger-Nose-Finger Test. Coordination: Romberg sign negative and Rapid alternating movements normal. He shows no pronator drift. He displays no seizure activity. Gait and coordination normal. Coordination and gait normal. GCS eye subscore is 4. GCS verbal subscore is 5. GCS motor subscore is 6.   Reflex Scores:       Tricep reflexes are 2+ on the right side and 2+ on the left side.       Bicep reflexes are 2+ on the right side and 2+ on the left side.       Brachioradialis reflexes are 2+ on the right side and 2+ on the left side.       Patellar  reflexes are 2+ on the right side and 2+ on the left side.       Achilles reflexes are 2+ on the right side and 2+ on the left side.  Skin: Skin is warm, dry, not diaphoretic and no rash. Capillary refill takes less than 2 seconds. No erythema   Psychiatric: His behavior is normal.   Nursing note and vitals reviewed.        Assessment:       1. Concussion without loss of consciousness, initial encounter          Plan:         Concussion without loss of consciousness, initial encounter    Discussed options with patient and employed shared decision making. His vitals are normal, minus slightly elevated blood pressure. Neuro exam is completely normal. No evidence of basilar skull fracture on exam. He has had no vomiting, no amnesia, and no LOC. GCS is normal. Per [place] CT head criteria his score is 0 indicating CT is not necessary. I discussed this with patient. I think the patient is having a concussion and post concussive syndrome which worsened today after increased audiovisual stimuli at work. I considered emergent intracranial pathology, but consider it highly unlikely based on history and and my exam. Will treat symptoms and give hand out on post concussive syndrome. Patient understands to go to ER should symptoms worsen in any way.

## 2022-07-24 ENCOUNTER — TELEPHONE (OUTPATIENT)
Dept: URGENT CARE | Facility: CLINIC | Age: 27
End: 2022-07-24
Payer: COMMERCIAL

## 2022-07-26 ENCOUNTER — TELEPHONE (OUTPATIENT)
Dept: PRIMARY CARE CLINIC | Facility: CLINIC | Age: 27
End: 2022-07-26
Payer: COMMERCIAL

## 2022-07-26 ENCOUNTER — CLINICAL SUPPORT (OUTPATIENT)
Dept: URGENT CARE | Facility: CLINIC | Age: 27
End: 2022-07-26
Payer: COMMERCIAL

## 2022-07-26 ENCOUNTER — PATIENT MESSAGE (OUTPATIENT)
Dept: PRIMARY CARE CLINIC | Facility: CLINIC | Age: 27
End: 2022-07-26
Payer: COMMERCIAL

## 2022-07-26 VITALS
SYSTOLIC BLOOD PRESSURE: 126 MMHG | BODY MASS INDEX: 25.03 KG/M2 | TEMPERATURE: 98 F | DIASTOLIC BLOOD PRESSURE: 82 MMHG | HEART RATE: 96 BPM | OXYGEN SATURATION: 99 % | RESPIRATION RATE: 18 BRPM | HEIGHT: 74 IN | WEIGHT: 195 LBS

## 2022-07-26 DIAGNOSIS — S06.0X9S CONCUSSION WITH LOSS OF CONSCIOUSNESS, SEQUELA: Primary | ICD-10-CM

## 2022-07-26 DIAGNOSIS — F07.81 POST CONCUSSION SYNDROME: Primary | ICD-10-CM

## 2022-07-26 PROCEDURE — 99214 OFFICE O/P EST MOD 30 MIN: CPT | Mod: S$GLB,,, | Performed by: PHYSICIAN ASSISTANT

## 2022-07-26 PROCEDURE — 99214 PR OFFICE/OUTPT VISIT, EST, LEVL IV, 30-39 MIN: ICD-10-PCS | Mod: S$GLB,,, | Performed by: PHYSICIAN ASSISTANT

## 2022-07-26 RX ORDER — NAPROXEN 500 MG/1
500 TABLET ORAL 2 TIMES DAILY WITH MEALS
Qty: 28 TABLET | Refills: 0 | Status: SHIPPED | OUTPATIENT
Start: 2022-07-26 | End: 2022-08-09

## 2022-07-26 NOTE — PATIENT INSTRUCTIONS
"Take naproxen up to 2x daily with food for pain. Avoid heavy mental stress or straining with the eyes until "brain fog" and headaches improve. If you are not starting to notice an improvement by later this week, call to make neurology follow up. You can call  to schedule.     Keep in mind it is normal to have persistent headaches, dizziness, and fatigue after head injuries. BUT if you have an intractable severe headache, multiple episodes of vomiting, severely off balance, passing out, one sided weakness, double vision, blurred vision, or seizure activity you should seek emergent attention.  "

## 2022-07-26 NOTE — TELEPHONE ENCOUNTER
Will need to be seen to be released to return to work if post concussion.   Can be with neurology or sports medicine ortho clinics.

## 2022-07-26 NOTE — TELEPHONE ENCOUNTER
Dr. Scott,   Is there a post-concussion clinic with [facility]? If so, who or where do I refer too? Is it a special referral in the system? With the traditional ortho referral, there isn't an option for concussion. Thanks.   Laura Cadena MD

## 2022-07-26 NOTE — TELEPHONE ENCOUNTER
----- Message from Fnai Faustin sent at 7/26/2022 10:35 AM CDT -----  Contact: Aime  Patient Is calling to speak with the nurse regarding when patient can return to work. Please give patient a call back at [phone] as requested today.  Thanks  LR

## 2022-07-26 NOTE — LETTER
July 26, 2022      [facility]  [address]  [place] [address]  Phone: [phone]       Patient: Aime Ricardo   YOB: 1995  Date of Visit: 07/26/2022    To Whom It May Concern:    Robert Ricardo  was at [facility] on 07/26/2022. He was diagnosed with post concussion syndrome. The patient may return to work/school on 08/1/22 with no restrictions. If he is requiring any further restrictions at this point in time, he should follow up with a neurologist. If you have any questions or concerns, or if I can be of further assistance, please do not hesitate to contact me.    Sincerely,    Rica Crowe PA-C

## 2022-07-26 NOTE — PROGRESS NOTES
"Subjective:       Patient ID: Aime Ricardo is a 26 y.o. male.    Vitals:  height is 6' 2" (1.88 m) and weight is 88.5 kg (195 lb). His temperature is 97.6 °F (36.4 °C). His blood pressure is 126/82 and his pulse is 96. His respiration is 18 and oxygen saturation is 99%.     Chief Complaint: Injury (Follow up for concussion - Entered by patient) and Follow-up (Pt came in for a follow up from a possible concussion. )    Pt is a 25 yo male presents with past hx of concussion x5 days ago. States he slipped and hit his head on the roof of his car while getting into the car. There was no LOC or vomiting. He is not on blood thinners. No amnesia. No seizure activity, extremity weakness, vision changes, numbness,tingling. He has had persistent intermittent HA, "brain fog," and fatigue. He felt he was starting to improve until he went to work yesterday. Works as a . Thinks the activity may have worsened symptoms. Pt also needed Dr. Note extended    Injury  This is a recurrent problem. The current episode started in the past 7 days. The problem occurs intermittently. The problem has been waxing and waning. Associated symptoms include fatigue, headaches and nausea. Pertinent negatives include no abdominal pain, anorexia, arthralgias, change in bowel habit, chest pain, chills, congestion, coughing, diaphoresis, fever, joint swelling, myalgias, neck pain, numbness, rash, sore throat, swollen glands, urinary symptoms, vertigo, visual change, vomiting or weakness. The symptoms are aggravated by standing, bending and exertion. He has tried rest for the symptoms. The treatment provided no relief.   Follow-up  Associated symptoms include fatigue, headaches and nausea. Pertinent negatives include no abdominal pain, anorexia, arthralgias, change in bowel habit, chest pain, chills, congestion, coughing, diaphoresis, fever, joint swelling, myalgias, neck pain, numbness, rash, sore throat, swollen glands, urinary symptoms, " vertigo, visual change, vomiting or weakness.       Constitution: Positive for fatigue. Negative for activity change, appetite change, chills, sweating, fever, unexpected weight change and generalized weakness.   HENT: Negative for congestion and sore throat.    Neck: Negative for neck pain and neck stiffness.   Cardiovascular: Negative for chest pain.   Respiratory: Negative for cough.    Gastrointestinal: Positive for nausea. Negative for abdominal pain, vomiting and diarrhea.   Musculoskeletal: Positive for pain and trauma. Negative for joint pain, joint swelling, abnormal ROM of joint and muscle ache.   Skin: Negative for rash.   Neurological: Positive for dizziness and headaches. Negative for history of vertigo, light-headedness, passing out, facial drooping, speech difficulty, coordination disturbances, loss of balance, history of migraines, disorientation, altered mental status, loss of consciousness, numbness, tingling, seizures and tremors.   Psychiatric/Behavioral: Negative for altered mental status and disorientation.       Objective:      Physical Exam   Constitutional: He is oriented to person, place, and time. He appears well-developed. He is cooperative.  Non-toxic appearance. He does not appear ill. No distress.   HENT:   Head: Normocephalic and atraumatic.   Ears:   Right Ear: Hearing, tympanic membrane, external ear and ear canal normal.   Left Ear: Hearing, tympanic membrane, external ear and ear canal normal.   Nose: Nose normal. No mucosal edema, rhinorrhea or nasal deformity. No epistaxis. Right sinus exhibits no maxillary sinus tenderness and no frontal sinus tenderness. Left sinus exhibits no maxillary sinus tenderness and no frontal sinus tenderness.   Mouth/Throat: Uvula is midline, oropharynx is clear and moist and mucous membranes are normal. No trismus in the jaw. Normal dentition. No uvula swelling. No posterior oropharyngeal erythema.   Head atraumatic.    Neg for racoon eyes or dumas  sign.     No rhinorrhea.     TM WNL no hemotympanum       Comments: Head atraumatic.    Neg for racoon eyes or dumas sign.     No rhinorrhea.     TM WNL no hemotympanum   Eyes: Conjunctivae and lids are normal. Right eye exhibits no discharge. Left eye exhibits no discharge. No scleral icterus.   Neck: Trachea normal and phonation normal. Neck supple.   Cardiovascular: Normal rate, regular rhythm, normal heart sounds and normal pulses.   Pulmonary/Chest: Effort normal and breath sounds normal. No respiratory distress.   Abdominal: Normal appearance and bowel sounds are normal. He exhibits no distension and no mass. Soft. There is no abdominal tenderness.   Musculoskeletal: Normal range of motion.         General: No deformity. Normal range of motion.   Neurological: no focal deficit. He is alert and oriented to person, place, and time. He has normal motor skills and normal sensation. He displays facial symmetry and no dysarthria. No cranial nerve deficit. He exhibits normal muscle tone. Gait and coordination normal. Coordination normal. GCS eye subscore is 4. GCS verbal subscore is 5. GCS motor subscore is 6.      Comments: Vision grossly intact    Skin: Skin is warm, dry, intact, not diaphoretic and not pale.   Psychiatric: His speech is normal and behavior is normal. Judgment and thought content normal.   Nursing note and vitals reviewed.        Assessment:       1. Post concussion syndrome          Plan:       VSS Neuro intact. No alarm symptoms or exam findings. rx naproxen, given reassurance, extended work note for a few more days. Recommend brain rest, avoid any contact sports or things that may increase risk of subsequent head injury. F/u with neurology if no improvement throughout the course of the week. Explained alarm symptoms for which to present to ER.    Post concussion syndrome  -     naproxen (NAPROSYN) 500 MG tablet; Take 1 tablet (500 mg total) by mouth 2 (two) times daily with meals. for 14 days   "Dispense: 28 tablet; Refill: 0  -     Ambulatory referral/consult to Neurology    Rica Crowe PA-C  [facility]         Patient Instructions   Take naproxen up to 2x daily with food for pain. Avoid heavy mental stress or straining with the eyes until "brain fog" and headaches improve. If you are not starting to notice an improvement by later this week, call to make neurology follow up. You can call  to schedule.     Keep in mind it is normal to have persistent headaches, dizziness, and fatigue after head injuries. BUT if you have an intractable severe headache, multiple episodes of vomiting, severely off balance, passing out, one sided weakness, double vision, blurred vision, or seizure activity you should seek emergent attention.                   "

## 2022-07-26 NOTE — TELEPHONE ENCOUNTER
----- Message from Fani Faustin sent at 7/26/2022 10:35 AM CDT -----  Contact: Aime  Patient Is calling to speak with the nurse regarding when patient can return to work. Please give patient a call back at [phone] as requested today.  Thanks  LR

## 2022-07-27 NOTE — TELEPHONE ENCOUNTER
We can see if he can see neurology NP if not Dr. Scott is happy to see patient for the post-concussion care. Please inform. Let Dr. Scott know of first available in [facility] with neurology.

## 2022-08-09 ENCOUNTER — TELEPHONE (OUTPATIENT)
Dept: NEUROLOGY | Facility: CLINIC | Age: 27
End: 2022-08-09
Payer: COMMERCIAL

## 2023-01-04 ENCOUNTER — OFFICE VISIT (OUTPATIENT)
Dept: INTERNAL MEDICINE | Facility: CLINIC | Age: 28
End: 2023-01-04
Payer: COMMERCIAL

## 2023-01-04 DIAGNOSIS — U07.1 COVID: Primary | ICD-10-CM

## 2023-01-04 PROCEDURE — 1159F PR MEDICATION LIST DOCUMENTED IN MEDICAL RECORD: ICD-10-PCS | Mod: CPTII,95,, | Performed by: NURSE PRACTITIONER

## 2023-01-04 PROCEDURE — 99212 PR OFFICE/OUTPT VISIT, EST, LEVL II, 10-19 MIN: ICD-10-PCS | Mod: 95,,, | Performed by: NURSE PRACTITIONER

## 2023-01-04 PROCEDURE — 1160F RVW MEDS BY RX/DR IN RCRD: CPT | Mod: CPTII,95,, | Performed by: NURSE PRACTITIONER

## 2023-01-04 PROCEDURE — 99212 OFFICE O/P EST SF 10 MIN: CPT | Mod: 95,,, | Performed by: NURSE PRACTITIONER

## 2023-01-04 PROCEDURE — 1160F PR REVIEW ALL MEDS BY PRESCRIBER/CLIN PHARMACIST DOCUMENTED: ICD-10-PCS | Mod: CPTII,95,, | Performed by: NURSE PRACTITIONER

## 2023-01-04 PROCEDURE — 1159F MED LIST DOCD IN RCRD: CPT | Mod: CPTII,95,, | Performed by: NURSE PRACTITIONER

## 2023-01-04 NOTE — PROGRESS NOTES
The patient location is: in [place] at home  The chief complaint leading to consultation is: COVID    Visit type: audiovisual    Face to Face time with patient: 2:21 PM -2:31 PM    14 minutes of total time spent on the encounter, which includes face to face time and non-face to face time preparing to see the patient (eg, review of tests), Obtaining and/or reviewing separately obtained history, Documenting clinical information in the electronic or other health record, Independently interpreting results (not separately reported) and communicating results to the patient/family/caregiver, or Care coordination (not separately reported).       Each patient to whom he or she provides medical services by telemedicine is:  (1) informed of the relationship between the physician and patient and the respective role of any other health care provider with respect to management of the patient; and (2) notified that he or she may decline to receive medical services by telemedicine and may withdraw from such care at any time.      Subjective:       Patient ID: Aime Ricardo is a 27 y.o. male.    Chief Complaint: COVID-19 Concerns    Mr. Ricardo is seen as a virtual visit for COVID.  Reports he started developing symptoms on 12/27.  Test positive with an at home COVID test on 12/29.  Symptoms have mostly resolved except for mild cough and night sweats.  He is remained afebrile times the past 2-3 days.  Work is requesting clearance to return to work.    Patient Active Problem List   Diagnosis    Anxiety       History reviewed. No pertinent family history.  History reviewed. No pertinent surgical history.      Current Outpatient Medications:     dextroamphetamine-amphetamine (ADDERALL XR) 20 MG 24 hr capsule, Take 20 mg by mouth every morning., Disp: , Rfl:     Review of Systems   Constitutional:  Negative for activity change, chills, fatigue, fever and unexpected weight change.   HENT:  Negative for hearing loss, rhinorrhea and  "trouble swallowing.    Eyes:  Negative for discharge and visual disturbance.   Respiratory:  Negative for chest tightness and wheezing.    Cardiovascular:  Negative for chest pain and palpitations.   Gastrointestinal:  Negative for blood in stool, constipation, diarrhea and vomiting.   Endocrine: Negative for polydipsia and polyuria.   Genitourinary:  Negative for difficulty urinating, hematuria and urgency.   Musculoskeletal:  Negative for arthralgias, joint swelling and neck pain.   Neurological:  Negative for weakness and headaches.   Psychiatric/Behavioral:  Negative for confusion and dysphoric mood.      Objective:   There were no vitals taken for this visit.     Physical Exam  Constitutional:       General: He is not in acute distress.     Appearance: Normal appearance. He is not ill-appearing or toxic-appearing.   Eyes:      Conjunctiva/sclera: Conjunctivae normal.   Pulmonary:      Effort: Pulmonary effort is normal. No respiratory distress.   Neurological:      Mental Status: He is alert and oriented to person, place, and time.   Psychiatric:         Behavior: Behavior normal.       Assessment & Plan     Problem List Items Addressed This Visit    None  Visit Diagnoses       COVID    -  Primary    CDC quarantine guidelines reviewed.  He is cleared to return to work.  Will need to wear mask until day 10. Work excuse provided.              HELDER Santamaria      Portions of this note may have been created with voice recognition software. Occasional "wrong-word" or "sound-a-like" substitutions may have occurred due to the inherent limitations of voice recognition software. Please, read the note carefully and recognize, using context, where substitutions have occurred.     "

## 2023-01-04 NOTE — PATIENT INSTRUCTIONS
Isolation for at least 5 days from onset of symptoms. First day of symptoms is considered day 0. If symptoms have improved and remain free of fever for at least 24 hours (without the use of fever-reducing medication), isolation can be discontinued. Continue to properly wear a well-fitted mask around others at home and in public for 5 additional days.    Symptomatic treatment including rest and staying well hydrated. Over-the-counter Tylenol/ibuprofen as needed for fever, body aches, or headaches (if not contraindicated). Over-the-counter cough medication as needed.    Notify clinic or present to ER if develop new onset or worsening shortness of breath, dizziness, chest pain, or confusion.

## 2023-01-04 NOTE — LETTER
01/04/2023                 [facility]  [address]  [place] [address]  Phone: [phone]  Fax: [phone]   01/04/2023    Patient: Aime Ricardo   YOB: 1995   Date of Visit: 1/4/2023       To Whom it May Concern:    Aime Ricardo was seen in my clinic on 1/4/2023. He may return to work on 1/5/2022 .    If you have any questions or concerns, please don't hesitate to call.    Sincerely,         HELDER Bauer

## 2023-01-13 ENCOUNTER — OFFICE VISIT (OUTPATIENT)
Dept: PRIMARY CARE CLINIC | Facility: CLINIC | Age: 28
End: 2023-01-13
Payer: COMMERCIAL

## 2023-01-13 VITALS
HEIGHT: 74 IN | BODY MASS INDEX: 25.18 KG/M2 | DIASTOLIC BLOOD PRESSURE: 72 MMHG | SYSTOLIC BLOOD PRESSURE: 130 MMHG | HEART RATE: 78 BPM | WEIGHT: 196.19 LBS | TEMPERATURE: 99 F

## 2023-01-13 DIAGNOSIS — H65.93 MIDDLE EAR EFFUSION, BILATERAL: Primary | ICD-10-CM

## 2023-01-13 PROCEDURE — 3075F PR MOST RECENT SYSTOLIC BLOOD PRESS GE 130-139MM HG: ICD-10-PCS | Mod: CPTII,S$GLB,, | Performed by: PHYSICIAN ASSISTANT

## 2023-01-13 PROCEDURE — 99213 PR OFFICE/OUTPT VISIT, EST, LEVL III, 20-29 MIN: ICD-10-PCS | Mod: S$GLB,,, | Performed by: PHYSICIAN ASSISTANT

## 2023-01-13 PROCEDURE — 3008F BODY MASS INDEX DOCD: CPT | Mod: CPTII,S$GLB,, | Performed by: PHYSICIAN ASSISTANT

## 2023-01-13 PROCEDURE — 3008F PR BODY MASS INDEX (BMI) DOCUMENTED: ICD-10-PCS | Mod: CPTII,S$GLB,, | Performed by: PHYSICIAN ASSISTANT

## 2023-01-13 PROCEDURE — 99999 PR PBB SHADOW E&M-EST. PATIENT-LVL III: ICD-10-PCS | Mod: PBBFAC,,, | Performed by: PHYSICIAN ASSISTANT

## 2023-01-13 PROCEDURE — 1160F RVW MEDS BY RX/DR IN RCRD: CPT | Mod: CPTII,S$GLB,, | Performed by: PHYSICIAN ASSISTANT

## 2023-01-13 PROCEDURE — 3075F SYST BP GE 130 - 139MM HG: CPT | Mod: CPTII,S$GLB,, | Performed by: PHYSICIAN ASSISTANT

## 2023-01-13 PROCEDURE — 1160F PR REVIEW ALL MEDS BY PRESCRIBER/CLIN PHARMACIST DOCUMENTED: ICD-10-PCS | Mod: CPTII,S$GLB,, | Performed by: PHYSICIAN ASSISTANT

## 2023-01-13 PROCEDURE — 99213 OFFICE O/P EST LOW 20 MIN: CPT | Mod: S$GLB,,, | Performed by: PHYSICIAN ASSISTANT

## 2023-01-13 PROCEDURE — 3078F PR MOST RECENT DIASTOLIC BLOOD PRESSURE < 80 MM HG: ICD-10-PCS | Mod: CPTII,S$GLB,, | Performed by: PHYSICIAN ASSISTANT

## 2023-01-13 PROCEDURE — 3078F DIAST BP <80 MM HG: CPT | Mod: CPTII,S$GLB,, | Performed by: PHYSICIAN ASSISTANT

## 2023-01-13 PROCEDURE — 1159F MED LIST DOCD IN RCRD: CPT | Mod: CPTII,S$GLB,, | Performed by: PHYSICIAN ASSISTANT

## 2023-01-13 PROCEDURE — 1159F PR MEDICATION LIST DOCUMENTED IN MEDICAL RECORD: ICD-10-PCS | Mod: CPTII,S$GLB,, | Performed by: PHYSICIAN ASSISTANT

## 2023-01-13 PROCEDURE — 99999 PR PBB SHADOW E&M-EST. PATIENT-LVL III: CPT | Mod: PBBFAC,,, | Performed by: PHYSICIAN ASSISTANT

## 2023-01-13 RX ORDER — FLUTICASONE PROPIONATE 50 MCG
2 SPRAY, SUSPENSION (ML) NASAL DAILY
Qty: 16 G | Refills: 0 | Status: SHIPPED | OUTPATIENT
Start: 2023-01-13 | End: 2023-02-12

## 2023-01-13 NOTE — LETTER
January 13, 2023      [facility]  [address]  [place] [address]  Phone: [phone]  Fax: [phone]       Patient: Aime Ricardo   YOB: 1995  Date of Visit: 01/13/2023    To Whom It May Concern:    Robert Ricardo  was at [facility] on 01/13/2023. The patient may return to work/school on 01/16/2023 {With/no restrictions. If you have any questions or concerns, or if I can be of further assistance, please do not hesitate to contact me.    Sincerely,    Tamiko Blankenship MA

## 2023-01-13 NOTE — PROGRESS NOTES
"Subjective:      Patient ID: Aime Ricardo is a 27 y.o. male.    Chief Complaint: Dizziness    Aime Ricardo is a 27 y.o. male who presents to clinic for lightheaded feeling.  Started a week ago.  Dx with covid 2 weeks ago.  Felt in the evenings or at work.  Starts with tunnel vision then vision gets dark.  Episodes last for about a minute.      Dizziness:   Chronicity:  New  Onset:  In the past 7 days  Progression since onset:  Unchanged  Frequency - weeks/days included: once a day to every other day.   Associated symptoms: light-headedness.no ear congestion, no ear pain, no fever, no nausea (occasional after covid diagnosis), no vomiting and no aural fullness.Visual disturbances: tunnel vision when occurs.  Review of Systems   Constitutional:  Negative for fever.   HENT:  Negative for ear pain.    Gastrointestinal:  Negative for nausea (occasional after covid diagnosis) and vomiting.   Neurological:  Positive for dizziness and light-headedness.     Objective:   /72   Pulse 78   Temp 98.5 °F (36.9 °C)   Ht 6' 2" (1.88 m)   Wt 89 kg (196 lb 3.4 oz)   BMI 25.19 kg/m²   Physical Exam  Vitals reviewed.   Constitutional:       General: He is not in acute distress.     Appearance: He is well-developed. He is not diaphoretic.   HENT:      Head: Normocephalic and atraumatic.      Right Ear: External ear normal. A middle ear effusion is present.      Left Ear: External ear normal. A middle ear effusion is present.   Eyes:      Conjunctiva/sclera: Conjunctivae normal.   Cardiovascular:      Rate and Rhythm: Normal rate.      Heart sounds: No murmur heard.  Pulmonary:      Effort: Pulmonary effort is normal. No respiratory distress.   Musculoskeletal:         General: Normal range of motion.      Cervical back: Normal range of motion.   Skin:     General: Skin is warm and dry.   Neurological:      Mental Status: He is alert and oriented to person, place, and time.      Motor: No abnormal muscle tone. "   Psychiatric:         Behavior: Behavior normal.     Assessment:      1. Middle ear effusion, bilateral       Plan:   Middle ear effusion, bilateral  -     fluticasone propionate (FLONASE) 50 mcg/actuation nasal spray; 2 sprays (100 mcg total) by Each Nostril route once daily.  Dispense: 16 g; Refill: 0    Dizzy post covid - discussed dehydration after respiratory infection vs. JHONATHAN most likely two casues.  Recommended starting flonase and increasing hydration.  Also about to start a new job so under a lot of stress going through move.  Follow-up with pcp if not improving/any worsening symptoms       Laura Morales PA-C   Physician Assistant   [facility]

## 2023-12-20 ENCOUNTER — OFFICE VISIT (OUTPATIENT)
Dept: URGENT CARE | Facility: CLINIC | Age: 28
End: 2023-12-20
Payer: COMMERCIAL

## 2023-12-20 VITALS
BODY MASS INDEX: 25.15 KG/M2 | HEIGHT: 74 IN | RESPIRATION RATE: 18 BRPM | OXYGEN SATURATION: 98 % | TEMPERATURE: 99 F | WEIGHT: 196 LBS | DIASTOLIC BLOOD PRESSURE: 90 MMHG | HEART RATE: 83 BPM | SYSTOLIC BLOOD PRESSURE: 133 MMHG

## 2023-12-20 DIAGNOSIS — J34.9 SINUS PROBLEM: ICD-10-CM

## 2023-12-20 DIAGNOSIS — J06.9 UPPER RESPIRATORY TRACT INFECTION, UNSPECIFIED TYPE: Primary | ICD-10-CM

## 2023-12-20 DIAGNOSIS — H65.191 ACUTE EFFUSION OF RIGHT EAR: ICD-10-CM

## 2023-12-20 LAB
CTP QC/QA: YES
CTP QC/QA: YES
POC MOLECULAR INFLUENZA A AGN: NEGATIVE
POC MOLECULAR INFLUENZA B AGN: NEGATIVE
SARS-COV-2 AG RESP QL IA.RAPID: NEGATIVE

## 2023-12-20 PROCEDURE — 99214 PR OFFICE/OUTPT VISIT, EST, LEVL IV, 30-39 MIN: ICD-10-PCS | Mod: S$GLB,,,

## 2023-12-20 PROCEDURE — 87811 SARS CORONAVIRUS 2 ANTIGEN POCT, MANUAL READ: ICD-10-PCS | Mod: QW,S$GLB,,

## 2023-12-20 PROCEDURE — 87502 POCT INFLUENZA A/B MOLECULAR: ICD-10-PCS | Mod: QW,S$GLB,,

## 2023-12-20 PROCEDURE — 87502 INFLUENZA DNA AMP PROBE: CPT | Mod: QW,S$GLB,,

## 2023-12-20 PROCEDURE — 99214 OFFICE O/P EST MOD 30 MIN: CPT | Mod: S$GLB,,,

## 2023-12-20 PROCEDURE — 87811 SARS-COV-2 COVID19 W/OPTIC: CPT | Mod: QW,S$GLB,,

## 2023-12-20 RX ORDER — FLUTICASONE PROPIONATE 50 MCG
1 SPRAY, SUSPENSION (ML) NASAL 2 TIMES DAILY
Qty: 16 G | Refills: 0 | Status: SHIPPED | OUTPATIENT
Start: 2023-12-20 | End: 2023-12-30

## 2023-12-20 RX ORDER — AZITHROMYCIN 250 MG/1
TABLET, FILM COATED ORAL
Qty: 6 TABLET | Refills: 0 | Status: SHIPPED | OUTPATIENT
Start: 2023-12-20 | End: 2023-12-25

## 2023-12-20 RX ORDER — CETIRIZINE HYDROCHLORIDE 10 MG/1
10 TABLET ORAL DAILY
Qty: 30 TABLET | Refills: 11 | Status: SHIPPED | OUTPATIENT
Start: 2023-12-20 | End: 2024-02-23

## 2023-12-20 NOTE — PROGRESS NOTES
"Subjective:      Patient ID: Aime Ricardo is a 28 y.o. male.    Vitals:  height is 6' 2" (1.88 m) and weight is 88.9 kg (196 lb). His oral temperature is 99.2 °F (37.3 °C). His blood pressure is 133/90 (abnormal) and his pulse is 83. His respiration is 18 and oxygen saturation is 98%.     Chief Complaint: Other Misc (Flu symptoms - Entered by patient) and Sinus Problem    This is a 28 y.o. male who presents today with a chief complaint of body aches, post nasal drip, sore throat, nasal congestion, right ear pain, rhinorrhea- clear, subclinical fever, chills, sneezing, sinus pressure. Symptoms started one day ago and has progressively worsened.  Patient has taken OTC cold and flu medication with minimal relief. He states that his his cold sweats kept waking him up throughout the night. He states his coworker is out sick.  Denies any recent travel.  Denies history of seasonal allergies. Denies numbness or tingling. Denies radiation of pain. Denies lack of appetite, chest pain, shortness of breath, abdominal pain, nausea, vomiting, diarrhea, or rashes.                 Sinus Problem  This is a new problem. The current episode started yesterday. The problem has been gradually worsening since onset. There has been no fever. His pain is at a severity of 4/10. The pain is moderate. Associated symptoms include chills, congestion, coughing (from post nasal drip), diaphoresis, ear pain, headaches, a hoarse voice, neck pain, sinus pressure, sneezing and a sore throat. Pertinent negatives include no shortness of breath. Treatments tried: OTC cold and flu. The treatment provided mild relief.       Constitution: Positive for chills, sweating and fever. Negative for appetite change, fatigue and generalized weakness.   HENT:  Positive for ear pain, congestion, sinus pressure and sore throat. Negative for ear discharge, tinnitus, postnasal drip, sinus pain, trouble swallowing and voice change.    Neck: Positive for neck pain. " Negative for neck stiffness.   Cardiovascular:  Negative for chest pain, leg swelling and palpitations.   Eyes:  Negative for eye discharge, eye itching and eye pain.   Respiratory:  Positive for cough (from post nasal drip). Negative for chest tightness, sputum production, shortness of breath and wheezing.    Gastrointestinal:  Negative for abdominal pain, nausea, vomiting, constipation and diarrhea.   Genitourinary:  Negative for dysuria, frequency and urgency.   Musculoskeletal:  Negative for pain and muscle ache.   Skin:  Negative for rash.   Allergic/Immunologic: Positive for sneezing. Negative for environmental allergies, seasonal allergies and food allergies.   Neurological:  Positive for headaches. Negative for dizziness and light-headedness.   Psychiatric/Behavioral:  Negative for confusion.       Objective:     Physical Exam   Constitutional: He is oriented to person, place, and time. He appears well-developed. He is cooperative.  Non-toxic appearance. He appears ill. No distress.   HENT:   Head: Normocephalic and atraumatic.   Ears:   Right Ear: Hearing, external ear and ear canal normal. No no drainage, swelling or tenderness. Tympanic membrane is bulging. Tympanic membrane is not injected, not scarred, not perforated, not erythematous and not retracted. A middle ear effusion is present.   Left Ear: Hearing, external ear and ear canal normal. No no drainage, swelling or tenderness. Tympanic membrane is not injected, not scarred, not perforated, not erythematous, not retracted and not bulging. A middle ear effusion is present.   Nose: Mucosal edema and rhinorrhea present. No nasal deformity. No epistaxis. Right sinus exhibits no maxillary sinus tenderness and no frontal sinus tenderness. Left sinus exhibits no maxillary sinus tenderness and no frontal sinus tenderness.   Mouth/Throat: Uvula is midline and mucous membranes are normal. No trismus in the jaw. Normal dentition. No uvula swelling. Posterior  oropharyngeal edema, posterior oropharyngeal erythema and cobblestoning present. No oropharyngeal exudate. Tonsils are 0 on the right. Tonsils are 0 on the left. No tonsillar exudate.   Eyes: Conjunctivae and lids are normal. No scleral icterus. Extraocular movement intact   Neck: Trachea normal and phonation normal. Neck supple. No edema present. No erythema present. No neck rigidity present.   Cardiovascular: Normal rate, regular rhythm, normal heart sounds and normal pulses.   Pulmonary/Chest: Effort normal and breath sounds normal. No stridor. No respiratory distress. He has no decreased breath sounds. He has no wheezes. He has no rhonchi. He has no rales.   Abdominal: Normal appearance.   Musculoskeletal: Normal range of motion.         General: No deformity. Normal range of motion.   Lymphadenopathy:     He has cervical adenopathy.   Neurological: He is alert and oriented to person, place, and time. He exhibits normal muscle tone. Coordination normal.   Skin: Skin is warm, dry, intact, not diaphoretic and not pale.   Psychiatric: His speech is normal and behavior is normal. Judgment and thought content normal.   Nursing note and vitals reviewed.      Assessment:     1. Upper respiratory tract infection, unspecified type    2. Sinus problem    3. Acute effusion of right ear        Plan:   This is a 28 y.o. male who presents today with a chief complaint of body aches, post nasal drip, sore throat, nasal congestion, right ear pain, rhinorrhea- clear, subclinical fever, chills, sneezing, sinus pressure. Symptoms started one day ago and has progressively worsened.  Patient has taken OTC cold and flu medication with minimal relief. He states that his his cold sweats kept waking him up throughout the night. He states his coworker is out sick.  Denies any recent travel.  Denies history of seasonal allergies. Denies numbness or tingling. Denies radiation of pain. Denies lack of appetite, chest pain, shortness of  breath, abdominal pain, nausea, vomiting, diarrhea, or rashes.         Results for orders placed or performed in visit on 12/20/23   SARS Coronavirus 2 Antigen, POCT Manual Read   Result Value Ref Range    SARS Coronavirus 2 Antigen Negative Negative     Acceptable Yes    POCT Influenza A/B MOLECULAR   Result Value Ref Range    POC Molecular Influenza A Ag Negative Negative, Not Reported    POC Molecular Influenza B Ag Negative Negative, Not Reported     Acceptable Yes          Upper respiratory tract infection, unspecified type  -     azithromycin (Z-KALPESH) 250 MG tablet; Take 2 tablets by mouth on day 1; Take 1 tablet by mouth on days 2-5  Dispense: 6 tablet; Refill: 0  -     fluticasone propionate (FLONASE) 50 mcg/actuation nasal spray; 1 spray (50 mcg total) by Each Nostril route 2 (two) times daily. for 10 days  Dispense: 16 g; Refill: 0  -     cetirizine (ZYRTEC) 10 MG tablet; Take 1 tablet (10 mg total) by mouth once daily.  Dispense: 30 tablet; Refill: 11    Sinus problem  -     SARS Coronavirus 2 Antigen, POCT Manual Read  -     POCT Influenza A/B MOLECULAR    Acute effusion of right ear  -     azithromycin (Z-KALPESH) 250 MG tablet; Take 2 tablets by mouth on day 1; Take 1 tablet by mouth on days 2-5  Dispense: 6 tablet; Refill: 0      We will being doing a wait and see trial for 5-7 days with z-kalpesh regarding rhino sinusitis and right ear pain/effusion.  Patient can take OTC Acetaminophen (Tylenol) and/or Ibuprofen (Motrin) as needed for pain relief and/or fever relief. Continue to drink plenty of fluids. Follow up with PCP.      Patient Instructions   Please drink plenty of fluids.  Please get plenty of rest.  Please return here or go to the Emergency Department for any concerns or worsening of condition.  If you were given wait & see antibiotics, please wait 5-7 days before taking them, and only take them if your symptoms have worsened or not improved for rhino sinusitis and ear  pain.  If you do begin taking the antibiotics, please take them to completion.  Use Flonase nasal spray and take daily zyrtec as directed for 10 days.   Recommended behind the counter aleve D sinus and cold during the day and benadryl congestion at night if discussed treatment does not suffice.   If you do not have Hypertension or any history of palpitations, it is ok to take over the counter Sudafed or Mucinex D or Allegra-D or Claritin-D or Zyrtec-D.  If you do take one of the above, it is ok to combine that with plain over the counter Mucinex or Allegra or Claritin or Zyrtec.  If for example you are taking Zyrtec -D, you can combine that with Mucinex, but not Mucinex-D.  If you are taking Mucinex-D, you can combine that with plain Allegra or Claritin or Zyrtec.   If you do have Hypertension or palpitations, it is safe to take Coricidin HBP for relief of sinus symptoms.  If not allergic, please take over the counter Tylenol (Acetaminophen) and/or Motrin (Ibuprofen) as directed for control of pain and/or fever.  Please follow up with your primary care doctor or specialist as needed.    If you  smoke, please stop smoking.

## 2023-12-20 NOTE — PATIENT INSTRUCTIONS
Please drink plenty of fluids.  Please get plenty of rest.  Please return here or go to the Emergency Department for any concerns or worsening of condition.  If you were given wait & see antibiotics, please wait 5-7 days before taking them, and only take them if your symptoms have worsened or not improved for rhino sinusitis and ear pain.  If you do begin taking the antibiotics, please take them to completion.  Use Flonase nasal spray and take daily zyrtec as directed for 10 days.   Recommended behind the counter aleve D sinus and cold during the day and benadryl congestion at night if discussed treatment does not suffice.   If you do not have Hypertension or any history of palpitations, it is ok to take over the counter Sudafed or Mucinex D or Allegra-D or Claritin-D or Zyrtec-D.  If you do take one of the above, it is ok to combine that with plain over the counter Mucinex or Allegra or Claritin or Zyrtec.  If for example you are taking Zyrtec -D, you can combine that with Mucinex, but not Mucinex-D.  If you are taking Mucinex-D, you can combine that with plain Allegra or Claritin or Zyrtec.   If you do have Hypertension or palpitations, it is safe to take Coricidin HBP for relief of sinus symptoms.  If not allergic, please take over the counter Tylenol (Acetaminophen) and/or Motrin (Ibuprofen) as directed for control of pain and/or fever.  Please follow up with your primary care doctor or specialist as needed.    If you  smoke, please stop smoking.

## 2023-12-20 NOTE — LETTER
December 20, 2023      [facility]  [address]  [place] [address]  Phone: [phone]  Fax: [phone]       Patient: Aime Ricardo   YOB: 1995  Date of Visit: 12/20/2023    To Whom It May Concern:    Michael Ricardo  was at [facility] on 12/20/2023. The patient may return to work/school on 12/22/23 or sooner with no restrictions. If you have any questions or concerns, or if I can be of further assistance, please do not hesitate to contact me.    Sincerely,    Concetta López PA-C

## 2024-02-23 ENCOUNTER — OFFICE VISIT (OUTPATIENT)
Dept: INTERNAL MEDICINE | Facility: CLINIC | Age: 29
End: 2024-02-23
Payer: COMMERCIAL

## 2024-02-23 VITALS
WEIGHT: 208.56 LBS | BODY MASS INDEX: 26.77 KG/M2 | OXYGEN SATURATION: 98 % | HEIGHT: 74 IN | HEART RATE: 82 BPM | SYSTOLIC BLOOD PRESSURE: 138 MMHG | DIASTOLIC BLOOD PRESSURE: 82 MMHG

## 2024-02-23 DIAGNOSIS — J06.9 URI WITH COUGH AND CONGESTION: Primary | ICD-10-CM

## 2024-02-23 PROCEDURE — 3008F BODY MASS INDEX DOCD: CPT | Mod: CPTII,S$GLB,,

## 2024-02-23 PROCEDURE — 3079F DIAST BP 80-89 MM HG: CPT | Mod: CPTII,S$GLB,,

## 2024-02-23 PROCEDURE — 99999 PR PBB SHADOW E&M-EST. PATIENT-LVL III: CPT | Mod: PBBFAC,,,

## 2024-02-23 PROCEDURE — 99213 OFFICE O/P EST LOW 20 MIN: CPT | Mod: S$GLB,,,

## 2024-02-23 PROCEDURE — 87811 SARS-COV-2 COVID19 W/OPTIC: CPT | Mod: QW,S$GLB,,

## 2024-02-23 PROCEDURE — 87502 INFLUENZA DNA AMP PROBE: CPT | Mod: QW,S$GLB,,

## 2024-02-23 PROCEDURE — 3075F SYST BP GE 130 - 139MM HG: CPT | Mod: CPTII,S$GLB,,

## 2024-02-23 PROCEDURE — 1159F MED LIST DOCD IN RCRD: CPT | Mod: CPTII,S$GLB,,

## 2024-02-23 RX ORDER — PROMETHAZINE HYDROCHLORIDE AND DEXTROMETHORPHAN HYDROBROMIDE 6.25; 15 MG/5ML; MG/5ML
5 SYRUP ORAL EVERY 8 HOURS PRN
Qty: 118 ML | Refills: 0 | Status: SHIPPED | OUTPATIENT
Start: 2024-02-23 | End: 2024-03-04

## 2024-02-23 RX ORDER — BENZONATATE 200 MG/1
200 CAPSULE ORAL 3 TIMES DAILY PRN
Qty: 21 CAPSULE | Refills: 0 | Status: SHIPPED | OUTPATIENT
Start: 2024-02-23 | End: 2024-03-04

## 2024-02-23 NOTE — PATIENT INSTRUCTIONS
Flonase daily to decrease sinus congestion.  Tessalon Perles up to three x day for cough.  Promethazine DM at bedtime for cough and sleep.  Drink plenty of water to keep the mucus thin and mobile.  Return to clinic for symptoms that get worse, increase of cough with wheezing or shortness of breath.  Return to clinic for fever that returns after having gone away for more than 24 hours without fever reducing medication, especially with coarse or wet breath sounds.

## 2024-02-23 NOTE — PROGRESS NOTES
"    CHIEF COMPLAINT     Chief Complaint   Patient presents with    Cough    post nasal drip       HPI     Aime Ricardo is a 28 y.o. male who presents for xxx today.    PCP is Laura Cadena MD, patient is new to me. Reports symptoms began 6 days ago. Postnasal drip, malaise, itch and tickle in throat, cough. Denies fever but did have chills, n/v/d.     Past Medical History:  Past Medical History:   Diagnosis Date    Acne     Anxiety     Asthma     Bipolar 2 disorder 07/20/2016    dx by Dr. Cohen at [facility] , reported by patient       Home Medications:  Prior to Admission medications    Medication Sig Start Date End Date Taking? Authorizing Provider   dextroamphetamine-amphetamine (ADDERALL XR) 20 MG 24 hr capsule Take 20 mg by mouth every morning. 1/1/22  Yes Provider, Historical   cetirizine (ZYRTEC) 10 MG tablet Take 1 tablet (10 mg total) by mouth once daily. 12/20/23 12/19/24  Concetta López PA-C       Review of Systems:  Review of Systems    Health Maintainence:   Immunizations:  Health Maintenance         Date Due Completion Date    Hepatitis C Screening Never done ---    HIV Screening Never done ---    TETANUS VACCINE 05/30/2021 5/30/2011    Influenza Vaccine (1) 09/01/2023 10/17/2022    COVID-19 Vaccine (3 - 2023-24 season) 09/01/2023 4/14/2021             PHYSICAL EXAM     /82   Pulse 82   Ht 6' 2" (1.88 m)   Wt 94.6 kg (208 lb 8.9 oz)   SpO2 98%   BMI 26.78 kg/m²     Physical Exam    LABS     No results found for: "LABA1C", "HGBA1C"  CMP  Sodium   Date Value Ref Range Status   12/19/2018 141 136 - 145 mmol/L Final     Potassium   Date Value Ref Range Status   12/19/2018 4.6 3.5 - 5.1 mmol/L Final     Chloride   Date Value Ref Range Status   12/19/2018 106 95 - 110 mmol/L Final     CO2   Date Value Ref Range Status   12/19/2018 28 23 - 29 mmol/L Final     Glucose   Date Value Ref Range Status   12/19/2018 89 70 - 110 mg/dL Final     BUN   Date Value Ref Range Status   12/19/2018 11 " 6 - 20 mg/dL Final     Creatinine   Date Value Ref Range Status   12/19/2018 0.8 0.5 - 1.4 mg/dL Final     Calcium   Date Value Ref Range Status   12/19/2018 9.8 8.7 - 10.5 mg/dL Final     Total Protein   Date Value Ref Range Status   12/19/2018 7.5 6.0 - 8.4 g/dL Final     Albumin   Date Value Ref Range Status   12/19/2018 4.2 3.5 - 5.2 g/dL Final     Total Bilirubin   Date Value Ref Range Status   12/19/2018 0.5 0.1 - 1.0 mg/dL Final     Comment:     For infants and newborns, interpretation of results should be based  on gestational age, weight and in agreement with clinical  observations.  Premature Infant recommended reference ranges:  Up to 24 hours.............<8.0 mg/dL  Up to 48 hours............<12.0 mg/dL  3-5 days..................<15.0 mg/dL  6-29 days.................<15.0 mg/dL       Alkaline Phosphatase   Date Value Ref Range Status   12/19/2018 85 55 - 135 U/L Final     AST   Date Value Ref Range Status   12/19/2018 15 10 - 40 U/L Final     ALT   Date Value Ref Range Status   12/19/2018 15 10 - 44 U/L Final     Anion Gap   Date Value Ref Range Status   12/19/2018 7 (L) 8 - 16 mmol/L Final     eGFR if    Date Value Ref Range Status   12/19/2018 >60.0 >60 mL/min/1.73 m^2 Final     eGFR if non    Date Value Ref Range Status   12/19/2018 >60.0 >60 mL/min/1.73 m^2 Final     Comment:     Calculation used to obtain the estimated glomerular filtration  rate (eGFR) is the CKD-EPI equation.        Lab Results   Component Value Date    WBC 4.62 12/19/2018    HGB 15.0 12/19/2018    HCT 42.8 12/19/2018    MCV 84 12/19/2018     12/19/2018     Lab Results   Component Value Date    CHOL 182 12/19/2018    CHOL 170 08/18/2016     Lab Results   Component Value Date    HDL 46 12/19/2018    HDL 36 (L) 08/18/2016     Lab Results   Component Value Date    LDLCALC 105.6 12/19/2018    LDLCALC 72.8 08/18/2016     Lab Results   Component Value Date    TRIG 152 (H) 12/19/2018    TRIG 306  (H) 08/18/2016     Lab Results   Component Value Date    CHOLHDL 25.3 12/19/2018    CHOLHDL 21.2 08/18/2016     Lab Results   Component Value Date    TSH 1.483 12/19/2018       ASSESSMENT/PLAN   1. URI with cough and congestion  -     benzonatate (TESSALON) 200 MG capsule; Take 1 capsule (200 mg total) by mouth 3 (three) times daily as needed for Cough.  Dispense: 21 capsule; Refill: 0  -     promethazine-dextromethorphan (PROMETHAZINE-DM) 6.25-15 mg/5 mL Syrp; Take 5 mLs by mouth every 8 (eight) hours as needed (For cough. Do not operate heavy machinery. Will cause drowsiness.).  Dispense: 118 mL; Refill: 0         Kirill Mora NP   Department of Internal Medicine [facility]  11:31 AM

## 2024-02-23 NOTE — LETTER
February 23, 2024      [facility]  [address]  [facility] [address]  Phone: [phone]  Fax: [phone]       Patient: Aime Ricardo   YOB: 1995  Date of Visit: 02/23/2024    To Whom It May Concern:    Michael Ricardo  was at [facility] on 02/23/2024. The patient may return to work/school on 2/26/24 with no restrictions. If you have any questions or concerns, or if I can be of further assistance, please do not hesitate to contact me.    Sincerely,    Kirill Mora NP